# Patient Record
Sex: FEMALE | Race: WHITE | Employment: FULL TIME | URBAN - METROPOLITAN AREA
[De-identification: names, ages, dates, MRNs, and addresses within clinical notes are randomized per-mention and may not be internally consistent; named-entity substitution may affect disease eponyms.]

---

## 2017-02-28 ENCOUNTER — HOSPITAL ENCOUNTER (OUTPATIENT)
Dept: LAB | Age: 62
Discharge: HOME | End: 2017-02-28
Payer: COMMERCIAL

## 2017-02-28 ENCOUNTER — TELEPHONE (OUTPATIENT)
Dept: FAMILY MEDICINE CLINIC | Age: 62
End: 2017-02-28

## 2017-02-28 ENCOUNTER — OFFICE VISIT (OUTPATIENT)
Dept: FAMILY MEDICINE CLINIC | Age: 62
End: 2017-02-28

## 2017-02-28 VITALS
WEIGHT: 149 LBS | BODY MASS INDEX: 27.42 KG/M2 | SYSTOLIC BLOOD PRESSURE: 102 MMHG | HEART RATE: 70 BPM | TEMPERATURE: 97.7 F | HEIGHT: 62 IN | OXYGEN SATURATION: 98 % | DIASTOLIC BLOOD PRESSURE: 64 MMHG

## 2017-02-28 DIAGNOSIS — E53.8 B12 DEFICIENCY: ICD-10-CM

## 2017-02-28 DIAGNOSIS — Z92.89 HISTORY OF POSITIVE PPD: ICD-10-CM

## 2017-02-28 DIAGNOSIS — Z12.39 SCREENING FOR BREAST CANCER: ICD-10-CM

## 2017-02-28 DIAGNOSIS — Z00.00 ROUTINE GENERAL MEDICAL EXAMINATION AT A HEALTH CARE FACILITY: ICD-10-CM

## 2017-02-28 DIAGNOSIS — H93.19 TINNITUS, UNSPECIFIED LATERALITY: ICD-10-CM

## 2017-02-28 DIAGNOSIS — M81.0 OSTEOPOROSIS: Primary | ICD-10-CM

## 2017-02-28 DIAGNOSIS — M81.0 OSTEOPOROSIS: ICD-10-CM

## 2017-02-28 DIAGNOSIS — Z23 ENCOUNTER FOR IMMUNIZATION: ICD-10-CM

## 2017-02-28 DIAGNOSIS — Z86.2 HISTORY OF ANEMIA: ICD-10-CM

## 2017-02-28 DIAGNOSIS — E03.9 ACQUIRED HYPOTHYROIDISM: ICD-10-CM

## 2017-02-28 LAB
ALBUMIN SERPL BCP-MCNC: 4.2 G/DL (ref 3.4–5)
ALBUMIN/GLOB SERPL: 1.6 {RATIO} (ref 0.8–1.7)
ALP SERPL-CCNC: 69 U/L (ref 45–117)
ALT SERPL-CCNC: 20 U/L (ref 13–56)
ANION GAP BLD CALC-SCNC: 8 MMOL/L (ref 3–18)
AST SERPL W P-5'-P-CCNC: 11 U/L (ref 15–37)
BILIRUB SERPL-MCNC: 0.5 MG/DL (ref 0.2–1)
BUN SERPL-MCNC: 9 MG/DL (ref 7–18)
BUN/CREAT SERPL: 12 (ref 12–20)
CALCIUM SERPL-MCNC: 9.2 MG/DL (ref 8.5–10.1)
CHLORIDE SERPL-SCNC: 104 MMOL/L (ref 100–108)
CHOLEST SERPL-MCNC: 214 MG/DL
CO2 SERPL-SCNC: 29 MMOL/L (ref 21–32)
CREAT SERPL-MCNC: 0.76 MG/DL (ref 0.6–1.3)
ERYTHROCYTE [DISTWIDTH] IN BLOOD BY AUTOMATED COUNT: 13.1 % (ref 11.6–14.5)
GLOBULIN SER CALC-MCNC: 2.7 G/DL (ref 2–4)
GLUCOSE SERPL-MCNC: 89 MG/DL (ref 74–99)
HCT VFR BLD AUTO: 42.6 % (ref 35–45)
HDLC SERPL-MCNC: 68 MG/DL (ref 40–60)
HDLC SERPL: 3.1 {RATIO} (ref 0–5)
HGB BLD-MCNC: 14.1 G/DL (ref 12–16)
LDLC SERPL CALC-MCNC: 127.4 MG/DL (ref 0–100)
LIPID PROFILE,FLP: ABNORMAL
MCH RBC QN AUTO: 29.6 PG (ref 24–34)
MCHC RBC AUTO-ENTMCNC: 33.1 G/DL (ref 31–37)
MCV RBC AUTO: 89.3 FL (ref 74–97)
PLATELET # BLD AUTO: 308 K/UL (ref 135–420)
PMV BLD AUTO: 11 FL (ref 9.2–11.8)
POTASSIUM SERPL-SCNC: 4.2 MMOL/L (ref 3.5–5.5)
PROT SERPL-MCNC: 6.9 G/DL (ref 6.4–8.2)
RBC # BLD AUTO: 4.77 M/UL (ref 4.2–5.3)
SODIUM SERPL-SCNC: 141 MMOL/L (ref 136–145)
T3FREE SERPL-MCNC: 3.1 PG/ML (ref 2.3–4.2)
T4 FREE SERPL-MCNC: 1.2 NG/DL (ref 0.7–1.5)
TRIGL SERPL-MCNC: 93 MG/DL (ref ?–150)
TSH SERPL DL<=0.05 MIU/L-ACNC: 0.77 UIU/ML (ref 0.36–3.74)
VIT B12 SERPL-MCNC: 1437 PG/ML (ref 211–911)
VLDLC SERPL CALC-MCNC: 18.6 MG/DL
WBC # BLD AUTO: 5.2 K/UL (ref 4.6–13.2)

## 2017-02-28 PROCEDURE — 86480 TB TEST CELL IMMUN MEASURE: CPT | Performed by: INTERNAL MEDICINE

## 2017-02-28 PROCEDURE — 82607 VITAMIN B-12: CPT | Performed by: INTERNAL MEDICINE

## 2017-02-28 PROCEDURE — 84439 ASSAY OF FREE THYROXINE: CPT | Performed by: INTERNAL MEDICINE

## 2017-02-28 PROCEDURE — 84443 ASSAY THYROID STIM HORMONE: CPT | Performed by: INTERNAL MEDICINE

## 2017-02-28 PROCEDURE — 36415 COLL VENOUS BLD VENIPUNCTURE: CPT | Performed by: INTERNAL MEDICINE

## 2017-02-28 PROCEDURE — 84481 FREE ASSAY (FT-3): CPT | Performed by: INTERNAL MEDICINE

## 2017-02-28 PROCEDURE — 80053 COMPREHEN METABOLIC PANEL: CPT | Performed by: INTERNAL MEDICINE

## 2017-02-28 PROCEDURE — 80061 LIPID PANEL: CPT | Performed by: INTERNAL MEDICINE

## 2017-02-28 PROCEDURE — 85027 COMPLETE CBC AUTOMATED: CPT | Performed by: INTERNAL MEDICINE

## 2017-02-28 PROCEDURE — 82306 VITAMIN D 25 HYDROXY: CPT | Performed by: INTERNAL MEDICINE

## 2017-02-28 RX ORDER — UBIDECARENONE 50 MG
50 CAPSULE ORAL DAILY
COMMUNITY

## 2017-02-28 RX ORDER — FLUVOXAMINE MALEATE 50 MG/1
TABLET ORAL
COMMUNITY
End: 2017-06-06 | Stop reason: SDUPTHER

## 2017-02-28 RX ORDER — LEVOTHYROXINE AND LIOTHYRONINE 9.5; 2.25 UG/1; UG/1
TABLET ORAL DAILY
COMMUNITY
End: 2017-03-22 | Stop reason: SDUPTHER

## 2017-02-28 RX ORDER — ESTRADIOL 0.1 MG/G
2 CREAM VAGINAL DAILY
COMMUNITY
End: 2019-04-22 | Stop reason: ALTCHOICE

## 2017-02-28 RX ORDER — ERGOCALCIFEROL 1.25 MG/1
50000 CAPSULE ORAL
COMMUNITY
End: 2017-05-04 | Stop reason: ALTCHOICE

## 2017-02-28 RX ORDER — CYANOCOBALAMIN 1000 UG/ML
1000 INJECTION, SOLUTION INTRAMUSCULAR; SUBCUTANEOUS ONCE
COMMUNITY
End: 2017-05-02 | Stop reason: SDUPTHER

## 2017-02-28 RX ORDER — ESTRADIOL 0.04 MG/D
1 FILM, EXTENDED RELEASE TRANSDERMAL
COMMUNITY
End: 2017-03-06 | Stop reason: DRUGHIGH

## 2017-02-28 RX ORDER — MULTIVITAMIN WITH IRON
1 TABLET ORAL DAILY
COMMUNITY
End: 2018-10-01

## 2017-02-28 RX ORDER — PROGESTERONE 100 MG/1
100 CAPSULE ORAL
COMMUNITY
End: 2017-07-13 | Stop reason: SDUPTHER

## 2017-02-28 NOTE — TELEPHONE ENCOUNTER
[facility] is not able to fill.  Provided number for speciality pharmacy at [phone]

## 2017-02-28 NOTE — TELEPHONE ENCOUNTER
[address] [facility] called in regards to pt's Ann Marie Kaufman, saying that it needs to be filled at a specialty pharmacy.

## 2017-02-28 NOTE — PROGRESS NOTES
Deanna Mallory is a 64 y.o. female  Chief Complaint   Patient presents with   [address]     1. Have you been to the ER, urgent care clinic since your last visit? Hospitalized since your last visit? No    2. Have you seen or consulted any other health care providers outside of the [address] [place] [address] since your last visit? Include any pap smears or colon screening.  No

## 2017-02-28 NOTE — PATIENT INSTRUCTIONS
Denosumab (By injection)   Denosumab (xax-JMF-kn-mab)  Treats osteoporosis, bone cancer, and hypercalcemia and other bone problems in patients who have cancer. Brand Name(s):Prolia, Xgeva   There may be other brand names for this medicine. When This Medicine Should Not Be Used: This medicine is not right for everyone. You should not receive it if you had an allergic reaction to denosumab or if you are pregnant. How to Use This Medicine:   Injectable  · A doctor or other health professional will give you this medicine. This medicine is usually given as a shot under the skin of your upper arm, upper thigh, or stomach. · This medicine should come with a Medication Guide. Ask your pharmacist for a copy if you do not have one. · Missed dose: Call your doctor or pharmacist for instructions. Drugs and Foods to Avoid:   Ask your doctor or pharmacist before using any other medicine, including over-the-counter medicines, vitamins, and herbal products. · Do not use Prolia® and Xgeva® together. They contain the same medicine. · Some medicines can affect how denosumab works. Tell your doctor if you are also using medicine that weakens your immune system, such as a steroid or cancer medicine. Warnings While Using This Medicine:   · This medicine may cause birth defects if either partner is using it during conception or pregnancy. Tell your doctor right away if you or your partner becomes pregnant. Use an effective form of birth control to prevent pregnancy. Women who are being treated with [place] should continue using birth control for at least 5 months after the last dose. · Tell your doctor if you are breastfeeding, or if you have kidney disease, diabetes, gum disease, or an allergy to latex. Tell your doctor if you have problems with your thyroid, parathyroid, or digestive system.   · This medicine may cause the following problems:  ¨ Low calcium levels in your blood  ¨ Jaw problems  ¨ Broken thigh bone  ¨ Increased risk of infections  ¨ Rash or other skin problems  · You must have regular dental exams while you are being treated with this medicine. Tell your dentist that you are using this medicine. Do not have a tooth pulled or have other dental work. · Your doctor will do lab tests at regular visits to check on the effects of this medicine. Keep all appointments. Possible Side Effects While Using This Medicine:   Call your doctor right away if you notice any of these side effects:  · Allergic reaction: Itching or hives, swelling in your face or hands, swelling or tingling in your mouth or throat, chest tightness, trouble breathing  · Blistering, peeling, red skin rash  · Chest pain, fast or uneven heartbeat, trouble breathing  · Fever, chills, cough, sore throat, and body aches  · Muscle spasms or twitching, numbness or tingling in your fingers, toes, or lips  · Pain or burning during urination, change in how much or how often you urinate  · Pain, swelling, heavy feeling, or numbness in the mouth or jaw, loose teeth or other teeth problems  · Severe bone, joint, or muscle pain  · Unusual pain in your thigh, groin, or hip  If you notice these less serious side effects, talk with your doctor:   · Diarrhea, nausea  · Headache, back pain  · Rash, redness, or itching skin  · Tiredness or weakness  If you notice other side effects that you think are caused by this medicine, tell your doctor. Call your doctor for medical advice about side effects. You may report side effects to FDA at 1-156-FDA-2571  © 2016 [address] is for End User's use only and may not be sold, redistributed or otherwise used for commercial purposes. The above information is an  only. It is not intended as medical advice for individual conditions or treatments. Talk to your doctor, nurse or pharmacist before following any medical regimen to see if it is safe and effective for you.

## 2017-02-28 NOTE — PROGRESS NOTES
Assessment/Plan:    Jemma Benites was seen today for establish care. Diagnoses and all orders for this visit:    Osteoporosis- failed forteo. Is very concerned about osteonecrosis of jaw and wishes to avoid bisphosphonates. I discussed lack of evidence that HRT helps treat osteoporosis and wish for her to eventually wean off this. -     VITAMIN D, 25 HYDROXY; Future  -     denosumab (PROLIA) 60 mg/mL injection; 1 mL by SubCUTAneous route every 6 months. 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 If positive, will treat. -     QUANTIFERON TB GOLD; Future    Tinnitus  -pt to make appt with Dr. Asael Alvarez    The plan was discussed with the patient. The patient verbalized understanding and is in agreement with the plan. All medication potential side effects were discussed with the patient. Health Maintenance: pap- 2 yrs ago. mammo due. [facility] 3 yrs, no polyps. Dr. Syed Plan. Health Maintenance   Topic Date Due    BREAST CANCER SCRN MAMMOGRAM  08/23/2005    FOBT Q 1 YEAR AGE 50-75  08/23/2005    ZOSTER VACCINE AGE 60>  08/23/2015    PAP AKA CERVICAL CYTOLOGY  02/02/2018    DTaP/Tdap/Td series (2 - Td) 02/28/2027    Hepatitis C Screening  Addressed    INFLUENZA AGE 9 TO ADULT  Completed       Jono Gabriel is a 64 y.o.  female and presents with Establish Care     Subjective:  Pt is here to establish care. Pt c/o bilat tinnitus x 2 mos. No hearing loss. States it's constant. No vertigo. Is requesting Dr. Urvashi Grover.   She is concerned that her meds are causing the tinnitus (specifically the luvox and thyroid combo). She denies loud noise exposure. Osteoporosis- she does weight bearing. She is taking vitamin D. She is on HRT only for this reason. Not on bisphosphonate. Had been tried on forteo, but had bad side effect. She has been hesitant to do bisphosphonate b/c of concern of osteonecrosis of jaw. dexa 2016. Hypothyroid - on armour and synthroid. Has h/o positive ppd in 1970s. She does work for [employer] and has possible exposures. On luvox for mild depression/SAD. Has been on anti-depressants since age of 36. Has been on prozac and celxa in past. Feels sx are controlled. ROS:  Constitutional: No recent weight change. No weakness/fatigue. No f/c. Skin: No rashes, change in nails/hair, itching   HENT: No HA, dizziness. No hearing loss/+tinnitus. No nasal congestion/discharge. Eyes: No change in vision, double/blurred vision or eye pain/redness. Cardiovascular: No CP/palpitations. No ORELLANA/orthopnea/PND. Respiratory: No cough/sputum, dyspnea, wheezing. Gastointestinal: No dysphagia, reflux. No n/v. No constipation/diarrhea. No melena/rectal bleeding. Genitourinary: No dysuria, urinary hesitancy, nocturia, hematuria. No incontinence. Musculoskeletal: No joint pain/stiffness. No muscle pain/tenderness. Endo: No heat/cold intolerance, no polyuria/polydypsia. Heme: + h/o anemia. No easy bleeding/bruising. Allergy/Immunology: No seasonal rhinitis. Denies frequent colds, sinus/ear infections. Neurological: No seizures/numbness/weakness. No paresthesias. Psychiatric:  + depression, no anxiety.    PMH:  Past Medical History:   Diagnosis Date    Depression     Osteoarthritis     Thyroid disease     Tinnitus        PSH:  Past Surgical History:   Procedure Laterality Date    HX BACK SURGERY  2011    HX  SECTION          SH:  Social History   Substance Use Topics    Smoking status: Never Smoker    Smokeless tobacco: Not on file    Alcohol use No       FH:  Family History   Problem Relation Age of Onset    Osteoporosis Mother     Heart Disease Mother     Hypertension Mother     Arthritis-osteo Mother     Asthma Father     Hypertension Father     Osteoporosis Sister     Thyroid Disease Sister     Arthritis-osteo Sister     Bipolar Disorder Brother        Medications/Allergies:    Current Outpatient Prescriptions:     estradiol (VIVELLE) 0.0375 mg/24 hr, 1 Patch by TransDERmal route every Monday., Disp: , Rfl:     progesterone (PROMETRIUM) 100 mg capsule, Take 100 mg by mouth nightly., Disp: , Rfl:     estradiol (ESTRACE) 0.01 % (0.1 mg/gram) vaginal cream, Insert 2 g into vagina daily. , Disp: , Rfl:     ergocalciferol (VITAMIN D2) 50,000 unit capsule, Take 50,000 Units by mouth Every Mon, Wed & Sun., Disp: , Rfl:     thyroid, Pork, (ARMOUR THYROID) 15 mg tablet, Take  by mouth daily. , Disp: , Rfl:     fluvoxaMINE (LUVOX) 50 mg tablet, Take  by mouth nightly., Disp: , Rfl:     cyanocobalamin (VITAMIN B-12) 1,000 mcg/mL injection, 1,000 mcg by IntraMUSCular route once., Disp: , Rfl:     co-enzyme Q-10 (CO Q-10) 50 mg capsule, Take 50 mg by mouth daily. , Disp: , Rfl:     prasterone, dhea,-calcium carb (DHEA) 10 mg-47 mg calcium tab, Take  by mouth., Disp: , Rfl:     multivitamin with iron (DAILY MULTI-VITAMINS/IRON) tablet, Take 1 Tab by mouth daily. , Disp: , Rfl:   Allergies   Allergen Reactions    Penicillins Anaphylaxis    Codeine Rash       Objective:  Visit Vitals    /64 (BP 1 Location: Right arm, BP Patient Position: Sitting)    Pulse 70    Temp 97.7 °F (36.5 °C) (Temporal)    Ht 5' 1.5\" (1.562 m)    Wt 149 lb (67.6 kg)    SpO2 98%    BMI 27.7 kg/m2      Constitutional: Well developed, nourished, no distress, alert, obese habitus   HENT: Exterior ears and tympanic membranes normal bilaterally. Supple neck. No thyromegaly or lymphadenopathy.  Oropharynx clear and moist mucous membranes. +bilat middle ear effusion   Eyes: Conjunctiva normal. PERRL. Cardiovascular: S1, S2.  RRR. No murmurs/rubs. No thrills palpated. No carotid bruits. Intact distal pulses. No edema. Pulmonary/Chest Wall: No abnormalities on inspection. Clear to auscultation bilaterally. No wheezing/rhonchi. Normal effort. GI: Soft, nontender, nondistended. Normal active bowel sounds. No  masses on palpation. No hepatosplenomegaly. Musculoskeletal: Gait normal.  Joints without deformity/tenderness. Neurological: Appropriate. No focal motor or sensory deficits. Speech normal.   Skin: No lesions/rashes on inspection. Psych: Appropriate affect, judgement and insight. Short-term memory intact.

## 2017-02-28 NOTE — TELEPHONE ENCOUNTER
Please fax to [address] [facility] and tell pt that's where it was sent b/c [facility]'t carry it

## 2017-02-28 NOTE — MR AVS SNAPSHOT
Visit Information Date & Time Provider Department Dept. Phone Encounter #  
 2/28/2017  1:00 PM Dandre Sherrill, [facility] [phone] [id number] Upcoming Health Maintenance Date Due  
 PAP AKA CERVICAL CYTOLOGY 8/23/1976 BREAST CANCER SCRN MAMMOGRAM 8/23/2005 FOBT Q 1 YEAR AGE 50-75 8/23/2005 ZOSTER VACCINE AGE 60> 8/23/2015 DTaP/Tdap/Td series (2 - Td) 2/28/2027 Allergies as of 2/28/2017  Review Complete On: 2/28/2017 By: Dandre Mccartney MD  
  
 Severity Noted Reaction Type Reactions Penicillins High 02/28/2017    Anaphylaxis Codeine  02/28/2017    Rash Current Immunizations  Reviewed on 2/28/2017 Name Date Tdap 2/28/2017  2:03 PM  
  
 Reviewed by Beni Jimenez LPN on 9/37/8317 at  2:03 PM  
 Reviewed by Beni Jimenez LPN on 7/63/0549 at  2:03 PM  
You Were Diagnosed With   
  
 Codes Comments Osteoporosis    -  Primary ICD-10-CM: M81.0 ICD-9-CM: 733.00 Acquired hypothyroidism     ICD-10-CM: E03.9 ICD-9-CM: 244.9 History of anemia     ICD-10-CM: Z86.2 ICD-9-CM: V12.3 B12 deficiency     ICD-10-CM: E53.8 ICD-9-CM: 266.2 Routine general medical examination at a health care facility     ICD-10-CM: Z00.00 ICD-9-CM: V70.0 Screening for breast cancer     ICD-10-CM: Z12.39 
ICD-9-CM: V76.10 History of positive PPD     ICD-10-CM: R76.11 
ICD-9-CM: 795.51 Encounter for immunization     ICD-10-CM: L62 ICD-9-CM: V03.89 Vitals BP  
  
  
  
  
  
 102/64 (BP 1 Location: Right arm, BP Patient Position: Sitting) Vitals History BMI and BSA Data Body Mass Index Body Surface Area  
 27.7 kg/m 2 1.71 m 2 Preferred Pharmacy Pharmacy Name Phone [facility] [address], [address] [facility] [phone] Your Updated Medication List  
  
   
This list is accurate as of: 2/28/17  2:04 PM.  Always use your most recent med list.  
  
  
  
  
 KALLIE THYROID 15 mg tablet Generic drug:  thyroid (Pork) Take  by mouth daily. CO Q-10 50 mg capsule Generic drug:  co-enzyme Q-10 Take 50 mg by mouth daily. DAILY MULTI-VITAMINS/IRON tablet Generic drug:  multivitamin with iron Take 1 Tab by mouth daily. denosumab 60 mg/mL injection Commonly known as:  Gera Michelle 1 mL by SubCUTAneous route every 6 months. DHEA 10 mg-47 mg calcium Tab Generic drug:  prasterone (dhea)-calcium carb Take  by mouth. * estradiol 0.0375 mg/24 hr  
Commonly known as:  VIVELLE  
1 Patch by TransDERmal route every Monday. * ESTRACE 0.01 % (0.1 mg/gram) vaginal cream  
Generic drug:  estradiol Insert 2 g into vagina daily. fluvoxaMINE 50 mg tablet Commonly known as:  Thora Sour Take  by mouth nightly. PROMETRIUM 100 mg capsule Generic drug:  progesterone Take 100 mg by mouth nightly. VITAMIN B-12 1,000 mcg/mL injection Generic drug:  cyanocobalamin  
1,000 mcg by IntraMUSCular route once. VITAMIN D2 50,000 unit capsule Generic drug:  ergocalciferol Take 50,000 Units by mouth Every Mon, Wed & Sun.  
  
 * Notice: This list has 2 medication(s) that are the same as other medications prescribed for you. Read the directions carefully, and ask your doctor or other care provider to review them with you. Prescriptions Sent to Pharmacy Refills  
 denosumab (PROLIA) 60 mg/mL injection 1 Si mL by SubCUTAneous route every 6 months. Class: Normal  
 Pharmacy: [facility] [address], [address] [place] [address] #: [phone] Route: SubCUTAneous We Performed the Following CO IMMUNIZ ADMIN,1 SINGLE/COMB VAC/TOXOID [id number] CPT(R)] TETANUS, DIPHTHERIA TOXOIDS AND ACELLULAR PERTUSSIS VACCINE (TDAP), IN INDIVIDS. >=7, IM [id number] CPT(R)] To-Do List   
 2017 Lab:  CBC W/O DIFF   
  
 2017 Lab:  LIPID PANEL   
  
 2017 Imaging:  JOHANN MAMMO BI SCREENING INCL CAD   
  
 02/28/2017 Lab:  METABOLIC PANEL, COMPREHENSIVE   
  
 02/28/2017 Microbiology:  QUANTIFERON TB GOLD   
  
 02/28/2017 Lab:  T3, FREE   
  
 02/28/2017 Lab:  T4, FREE   
  
 02/28/2017 Lab:  TSH 3RD GENERATION   
  
 02/28/2017 Lab:  VITAMIN B12   
  
 02/28/2017 Lab:  VITAMIN D, 25 HYDROXY Patient Instructions Denosumab (By injection) Denosumab (cso-OQU-we-mab) Treats osteoporosis, bone cancer, and hypercalcemia and other bone problems in patients who have cancer. Brand Name(s):Prolia, Tanya Jose There may be other brand names for this medicine. When This Medicine Should Not Be Used: This medicine is not right for everyone. You should not receive it if you had an allergic reaction to denosumab or if you are pregnant. How to Use This Medicine:  
Injectable · A doctor or other health professional will give you this medicine. This medicine is usually given as a shot under the skin of your upper arm, upper thigh, or stomach. · This medicine should come with a Medication Guide. Ask your pharmacist for a copy if you do not have one. · Missed dose: Call your doctor or pharmacist for instructions. Drugs and Foods to Avoid: Ask your doctor or pharmacist before using any other medicine, including over-the-counter medicines, vitamins, and herbal products. · Do not use Prolia® and Xgeva® together. They contain the same medicine. · Some medicines can affect how denosumab works. Tell your doctor if you are also using medicine that weakens your immune system, such as a steroid or cancer medicine. Warnings While Using This Medicine: · This medicine may cause birth defects if either partner is using it during conception or pregnancy. Tell your doctor right away if you or your partner becomes pregnant. Use an effective form of birth control to prevent pregnancy.  Women who are being [facility] with [facility] should continue using birth control for at least 5 months after the last dose. · Tell your doctor if you are breastfeeding, or if you have kidney disease, diabetes, gum disease, or an allergy to latex. Tell your doctor if you have problems with your thyroid, parathyroid, or digestive system. · This medicine may cause the following problems: 
¨ Low calcium levels in your blood ¨ Jaw problems ¨ Broken thigh bone ¨ Increased risk of infections ¨ Rash or other skin problems · You must have regular dental exams while you are being treated with this medicine. Tell your dentist that you are using this medicine. Do not have a tooth pulled or have other dental work. · Your doctor will do lab tests at regular visits to check on the effects of this medicine. Keep all appointments. Possible Side Effects While Using This Medicine:  
Call your doctor right away if you notice any of these side effects: · Allergic reaction: Itching or hives, swelling in your face or hands, swelling or tingling in your mouth or throat, chest tightness, trouble breathing · Blistering, peeling, red skin rash · Chest pain, fast or uneven heartbeat, trouble breathing · Fever, chills, cough, sore throat, and body aches · Muscle spasms or twitching, numbness or tingling in your fingers, toes, or lips · Pain or burning during urination, change in how much or how often you urinate · Pain, swelling, heavy feeling, or numbness in the mouth or jaw, loose teeth or other teeth problems · Severe bone, joint, or muscle pain · Unusual pain in your thigh, groin, or hip If you notice these less serious side effects, talk with your doctor: · Diarrhea, nausea · Headache, back pain · Rash, redness, or itching skin · Tiredness or weakness If you notice other side effects that you think are caused by this medicine, tell your doctor. Call your doctor for medical advice about side effects. You may report side effects to FDA at 9-394-JYM-3281 © 2016 [address] is for End User's use only and may not be sold, redistributed or otherwise used for commercial purposes. The above information is an  only. It is not intended as medical advice for individual conditions or treatments. Talk to your doctor, nurse or pharmacist before following any medical regimen to see if it is safe and effective for you. Introducing [facility]! [employer] introduces [employer] patient portal. Now you can access parts of your medical record, email your doctor's office, and request medication refills online. 1. In your internet browser, go to [email or web address] 2. Click on the First Time User? Click Here link in the Sign In box. You will see the New Member Sign Up page. 3. Enter your [employer] Access Code exactly as it appears below. You will not need to use this code after youve completed the sign-up process. If you do not sign up before the expiration date, you must request a new code. · [employer] Access Code: [email or web address] Expires: 5/29/2017  1:52 PM 
 
4. Enter the last four digits of your Social Security Number (xxxx) and Date of Birth (mm/dd/yyyy) as indicated and click Submit. You will be taken to the next sign-up page. 5. Create a [employer] ID. This will be your [employer] login ID and cannot be changed, so think of one that is secure and easy to remember. 6. Create a [employer] password. You can change your password at any time. 7. Enter your Password Reset Question and Answer. This can be used at a later time if you forget your password. 8. Enter your e-mail address. You will receive e-mail notification when new information is available in [address] 9. Click Sign Up. You can now view and download portions of your medical record. 10. Click the Download Summary menu link to download a portable copy of your medical information. If you have questions, please visit the Frequently Asked Questions section of the [employer] website. Remember, [employer] is NOT to be used for urgent needs. For medical emergencies, dial 911. Now available from your iPhone and Android! Please provide this summary of care documentation to your next provider. Your primary care clinician is listed as Ana Cook. If you have any questions after today's visit, please call [phone].

## 2017-03-01 ENCOUNTER — TELEPHONE (OUTPATIENT)
Dept: FAMILY MEDICINE CLINIC | Age: 62
End: 2017-03-01

## 2017-03-01 LAB — 25(OH)D3 SERPL-MCNC: >150 NG/ML (ref 30–100)

## 2017-03-02 NOTE — PROGRESS NOTES
Lvm for pt to call office. Her vitamin D is very high. I need to know how much she is taking. We either need to decrease dose or stop supplement. Otherwise, her labs look good. Her cholesterol is mildly elevated, but as we discussed, I don't think we need to jump to treat it.

## 2017-03-03 DIAGNOSIS — T45.2X1A VITAMIN D TOXICITY, ACCIDENTAL OR UNINTENTIONAL, INITIAL ENCOUNTER: Primary | ICD-10-CM

## 2017-03-03 PROBLEM — N30.10 INTERSTITIAL CYSTITIS: Status: ACTIVE | Noted: 2017-03-03

## 2017-03-03 LAB
ANNOTATION COMMENT IMP: NORMAL
M TB IFN-G CD4+ BCKGRND COR BLD-ACNC: 0 IU/ML
M TB IFN-G CD4+ T-CELLS BLD-ACNC: 0.03 IU/ML
M TB TUBERC IFN-G BLD QL: NEGATIVE
M TB TUBERC IGNF/MITOGEN IGNF CONTROL: >10 IU/ML
QUANTIFERON NIL VALUE: 0.03 IU/ML
SERVICE CMNT-IMP: NORMAL

## 2017-03-06 ENCOUNTER — TELEPHONE (OUTPATIENT)
Dept: FAMILY MEDICINE CLINIC | Age: 62
End: 2017-03-06

## 2017-03-06 RX ORDER — ESTRADIOL 0.03 MG/D
FILM, EXTENDED RELEASE TRANSDERMAL
Qty: 8 PATCH | Refills: 0 | Status: SHIPPED | OUTPATIENT
Start: 2017-03-06 | End: 2017-10-24 | Stop reason: SDUPTHER

## 2017-03-06 RX ORDER — ALENDRONATE SODIUM 70 MG/1
70 TABLET ORAL
Qty: 4 TAB | Refills: 11 | Status: SHIPPED | OUTPATIENT
Start: 2017-03-06 | End: 2017-10-24 | Stop reason: ALTCHOICE

## 2017-03-06 NOTE — TELEPHONE ENCOUNTER
Patient stated she will try fosamax, she also wants to know if she can stop her hormone therapy or should she be whined off.

## 2017-03-21 ENCOUNTER — HOSPITAL ENCOUNTER (OUTPATIENT)
Dept: MAMMOGRAPHY | Age: 62
Discharge: HOME | End: 2017-03-21
Attending: INTERNAL MEDICINE
Payer: COMMERCIAL

## 2017-03-21 DIAGNOSIS — Z12.31 VISIT FOR SCREENING MAMMOGRAM: ICD-10-CM

## 2017-03-21 PROCEDURE — 77067 SCR MAMMO BI INCL CAD: CPT

## 2017-03-22 RX ORDER — LEVOTHYROXINE SODIUM 75 UG/1
75 TABLET ORAL
Qty: 90 TAB | Refills: 3 | Status: SHIPPED | OUTPATIENT
Start: 2017-03-22 | End: 2018-03-20 | Stop reason: SDUPTHER

## 2017-03-22 RX ORDER — LEVOTHYROXINE AND LIOTHYRONINE 9.5; 2.25 UG/1; UG/1
15 TABLET ORAL DAILY
Qty: 90 TAB | Refills: 3 | Status: SHIPPED | OUTPATIENT
Start: 2017-03-22 | End: 2017-04-07 | Stop reason: SDUPTHER

## 2017-03-22 NOTE — TELEPHONE ENCOUNTER
Pt also takes Levothyroxine 75mg (pt forgot to add this to her list of medications); pt is going out of town tomorrow and needs a refill; please advise

## 2017-03-23 DIAGNOSIS — Z12.11 SCREEN FOR COLON CANCER: Primary | ICD-10-CM

## 2017-03-23 DIAGNOSIS — Z12.11 SCREEN FOR COLON CANCER: ICD-10-CM

## 2017-04-07 ENCOUNTER — TELEPHONE (OUTPATIENT)
Dept: FAMILY MEDICINE CLINIC | Age: 62
End: 2017-04-07

## 2017-04-07 RX ORDER — LEVOTHYROXINE AND LIOTHYRONINE 9.5; 2.25 UG/1; UG/1
15 TABLET ORAL 2 TIMES DAILY
Qty: 180 TAB | Refills: 3 | Status: SHIPPED | OUTPATIENT
Start: 2017-04-07 | End: 2018-03-22 | Stop reason: SDUPTHER

## 2017-04-07 NOTE — TELEPHONE ENCOUNTER
Pt called stating her [place] Thyroid was called in but it was incorrect dosing. The rx was written for 1 daily but the pt states she takes the medication 2 times daily. Please advise.

## 2017-05-02 ENCOUNTER — TELEPHONE (OUTPATIENT)
Dept: FAMILY MEDICINE CLINIC | Age: 62
End: 2017-05-02

## 2017-05-02 RX ORDER — CYANOCOBALAMIN 1000 UG/ML
1000 INJECTION, SOLUTION INTRAMUSCULAR; SUBCUTANEOUS
Qty: 10 ML | Refills: 0 | Status: SHIPPED | OUTPATIENT
Start: 2017-05-02 | End: 2018-05-20 | Stop reason: SDUPTHER

## 2017-05-02 NOTE — TELEPHONE ENCOUNTER
Pt requesting refill/new RX for:  Cyanocobalamin 1,000 mcg/ml    Last visit: 02/28/17    Thank you    Elie Mckay LPN

## 2017-05-03 ENCOUNTER — HOSPITAL ENCOUNTER (OUTPATIENT)
Dept: LAB | Age: 62
Discharge: HOME | End: 2017-05-03
Payer: COMMERCIAL

## 2017-05-03 DIAGNOSIS — T45.2X1A VITAMIN D TOXICITY, ACCIDENTAL OR UNINTENTIONAL, INITIAL ENCOUNTER: ICD-10-CM

## 2017-05-03 PROCEDURE — 82306 VITAMIN D 25 HYDROXY: CPT | Performed by: INTERNAL MEDICINE

## 2017-05-03 PROCEDURE — 36415 COLL VENOUS BLD VENIPUNCTURE: CPT | Performed by: INTERNAL MEDICINE

## 2017-05-04 LAB — 25(OH)D3 SERPL-MCNC: 55.9 NG/ML (ref 30–100)

## 2017-06-06 RX ORDER — FLUVOXAMINE MALEATE 50 MG/1
50 TABLET ORAL
Qty: 90 TAB | Refills: 1 | Status: SHIPPED | OUTPATIENT
Start: 2017-06-06 | End: 2017-12-11 | Stop reason: SDUPTHER

## 2017-06-06 NOTE — TELEPHONE ENCOUNTER
Med is under historical [facility] requesting refill o    Requested Prescriptions     Pending Prescriptions Disp Refills    fluvoxaMINE (LUVOX) 50 mg tablet       Sig: Take  by mouth nightly.

## 2017-07-13 RX ORDER — PROGESTERONE 100 MG/1
CAPSULE ORAL
Qty: 42 CAP | Refills: 0 | Status: SHIPPED | OUTPATIENT
Start: 2017-07-13 | End: 2017-10-27 | Stop reason: SDUPTHER

## 2017-10-24 ENCOUNTER — OFFICE VISIT (OUTPATIENT)
Dept: FAMILY MEDICINE CLINIC | Age: 62
End: 2017-10-24

## 2017-10-24 ENCOUNTER — TELEPHONE (OUTPATIENT)
Dept: FAMILY MEDICINE CLINIC | Age: 62
End: 2017-10-24

## 2017-10-24 VITALS
HEIGHT: 62 IN | WEIGHT: 150.6 LBS | BODY MASS INDEX: 27.71 KG/M2 | SYSTOLIC BLOOD PRESSURE: 128 MMHG | DIASTOLIC BLOOD PRESSURE: 80 MMHG | RESPIRATION RATE: 18 BRPM | HEART RATE: 75 BPM | TEMPERATURE: 97.7 F | OXYGEN SATURATION: 96 %

## 2017-10-24 DIAGNOSIS — N95.1 MENOPAUSAL SYMPTOM: Primary | ICD-10-CM

## 2017-10-24 DIAGNOSIS — M21.611 BUNION OF GREAT TOE OF RIGHT FOOT: ICD-10-CM

## 2017-10-24 DIAGNOSIS — Z12.11 SCREEN FOR COLON CANCER: ICD-10-CM

## 2017-10-24 DIAGNOSIS — Z23 ENCOUNTER FOR IMMUNIZATION: ICD-10-CM

## 2017-10-24 DIAGNOSIS — M81.0 AGE-RELATED OSTEOPOROSIS WITHOUT CURRENT PATHOLOGICAL FRACTURE: ICD-10-CM

## 2017-10-24 RX ORDER — ESTRADIOL 0.03 MG/D
FILM, EXTENDED RELEASE TRANSDERMAL
Qty: 24 PATCH | Refills: 1 | Status: SHIPPED | OUTPATIENT
Start: 2017-10-24 | End: 2018-08-21 | Stop reason: SDUPTHER

## 2017-10-24 NOTE — PROGRESS NOTES
Edilia Armstrong is a 58 y.o. female (: 1955) presenting to address:    Chief Complaint   Patient presents with    Hypothyroidism    Vitamin B12 Deficiency    Foot Pain     Right    Medication Refill    Medication Evaluation       Vitals:    10/24/17 1057   BP: 128/80   Pulse: 75   Resp: 18   Temp: 97.7 °F (36.5 °C)   TempSrc: Oral   SpO2: 96%   Weight: 150 lb 9.6 oz (68.3 kg)   Height: 5' 1.5\" (1.562 m)   PainSc:   7   PainLoc: Foot       Learning Assessment:     Learning Assessment 2017   PRIMARY LEARNER Patient   HIGHEST LEVEL OF EDUCATION - PRIMARY LEARNER  4 YEARS OF COLLEGE   PRIMARY LANGUAGE ENGLISH   LEARNER PREFERENCE PRIMARY DEMONSTRATION     PICTURES   ANSWERED BY Self   RELATIONSHIP SELF     Depression Screening:     PHQ over the last two weeks 2017   PHQ Not Done Medical Reason (indicate in comments)   Little interest or pleasure in doing things -   Feeling down, depressed or hopeless -   Total Score PHQ 2 -     Fall Risk Assessment:     Fall Risk Assessment, last 12 mths 10/24/2017   Able to walk? Yes   Fall in past 12 months? No     Abuse Screening:     Abuse Screening Questionnaire 10/24/2017   Do you ever feel afraid of your partner? N   Are you in a relationship with someone who physically or mentally threatens you? N   Is it safe for you to go home? Y     Coordination of Care Questionaire:   1. Have you been to the ER, urgent care clinic since your last visit? Hospitalized since your last visit? NO    2. Have you seen or consulted any other health care providers outside of the [address] [place] [address] since your last visit? Include any pap smears or colon screening. NO    Advanced Directive:   1. Do you have an Advanced Directive? YES    2. Would you like information on Advanced Directives?  NO

## 2017-10-24 NOTE — PATIENT INSTRUCTIONS
You have been referred to podiatry. Please call one of the preferred providers listed below and schedule your appointment. Once you have scheduled your appointment, please call the office at [phone] and leave the details of your appointment (provider you will be seeing, appointment date and time) on the voice mail.       Estela Banuelos Dr. [address] [place] [address]  [phone]

## 2017-10-24 NOTE — PROGRESS NOTES
Assessment/Plan:    1. Menopausal symptom  -refilled. Discussed risk of breast/ovarian cancer with increased duration of use  - estradiol (VIVELLE-DOT) 0.025 mg/24 hr ptsw; Apply twice weekly  Dispense: 24 Patch; Refill: 1    2. Age-related osteoporosis without current pathological fracture  -failed fosamax secondary to side effects  - denosumab (PROLIA) 60 mg/mL injection; 1 mL by SubCUTAneous route every 6 months. Dispense: 1 mL; Refill: 1    3. Bunion of great toe of right foot  -pt given info for podiatry    4. Screen for colon cancer  - OCCULT BLOOD, IMMUNOASSAY (FIT); Future    5. Encounter for immunization  - Influenza virus vaccine (QUADRIVALENT PRES FREE SYRINGE) IM ([id number])  - MS IMMUNIZ ADMIN,1 SINGLE/COMB VAC/TOXOID    The plan was discussed with the patient. The patient verbalized understanding and is in agreement with the plan. All medication potential side effects were discussed with the patient. Health Maintenance: still hasn't done FIT testing  Health Maintenance   Topic Date Due    FOBT Q 1 YEAR AGE 50-75  08/23/2005    ZOSTER VACCINE AGE 60>  06/23/2015    INFLUENZA AGE 9 TO ADULT  08/01/2017    PAP AKA CERVICAL CYTOLOGY  02/02/2018    BREAST CANCER SCRN MAMMOGRAM  03/21/2019    DTaP/Tdap/Td series (2 - Td) 02/28/2027    Hepatitis C Screening  Addressed       Veronique Lehman is a 58 y.o. female and presents with Hypothyroidism; Vitamin B12 Deficiency; Foot Pain (Right); Medication Refill; and Medication Evaluation     Subjective:  Pt was weaned off HRT. She states her interstitial cystitis flared, and she 'felt awful'. She states she was irritable and had night sweats. She wishes to continue on HRT for another 3 years. She is aware of risks, to include breast and ovarian cancer. Osteoporosis - intol of fosamax 2/2 gerd. She wishes to try prolia. Pt c/o R bunion. Has pain and her shoes don't fit. ROS:  Constitutional: No recent weight change. No weakness/fatigue. No f/c. Skin: No rashes, change in nails/hair, itching   HENT: No HA, dizziness. No hearing loss/tinnitus. No nasal congestion/discharge. Eyes: No change in vision, double/blurred vision or eye pain/redness. Cardiovascular: No CP/palpitations. No ORELLANA/orthopnea/PND. Respiratory: No cough/sputum, dyspnea, wheezing. Gastointestinal: No dysphagia,+ reflux. No n/v. No constipation/diarrhea. No melena/rectal bleeding. Genitourinary: No dysuria, urinary hesitancy, nocturia, hematuria. No incontinence. Musculoskeletal: No joint pain/stiffness. No muscle pain/tenderness. Endo: No heat/cold intolerance, no polyuria/polydypsia. Heme: No h/o anemia. No easy bleeding/bruising. Allergy/Immunology: No seasonal rhinitis. Denies frequent colds, sinus/ear infections. Neurological: No seizures/numbness/weakness. No paresthesias. Psychiatric:  No depression, anxiety. The problem list was updated as a part of today's visit. Patient Active Problem List   Diagnosis Code    Osteoporosis M81.0    Acquired hypothyroidism E03.9    History of anemia Z86.2    B12 deficiency E53.8    History of positive PPD R76.11    Tinnitus H93.19    Interstitial cystitis N30.10       The PSH, FH were reviewed. SH:  Social History   Substance Use Topics    Smoking status: Never Smoker    Smokeless tobacco: Never Used    Alcohol use No       Medications/Allergies:  Current Outpatient Prescriptions on File Prior to Visit   Medication Sig Dispense Refill    pentosan polysulfate sodium (ELMIRON) 100 mg capsule Take 1 Cap by mouth Before breakfast, lunch, and dinner. Indications: Interstitial Cystitis 90 Cap 0    progesterone (PROMETRIUM) 100 mg capsule Take one cap PO daily x 2weeks. Then two weeks off. 42 Cap 0    fluvoxaMINE (LUVOX) 50 mg tablet Take 1 Tab by mouth nightly. 90 Tab 1    cyanocobalamin (VITAMIN B-12) 1,000 mcg/mL injection 1 mL by IntraMUSCular route every thirty (30) days.  10 mL 0    thyroid, Pork, (ARMOUR THYROID) 15 mg tablet Take 1 Tab by mouth two (2) times a day. 180 Tab 3    levothyroxine (SYNTHROID) 75 mcg tablet Take 1 Tab by mouth Daily (before breakfast). 90 Tab 3    estradiol (VIVELLE-DOT) 0.025 mg/24 hr ptsw Apply twice weekly 8 Patch 0    estradiol (ESTRACE) 0.01 % (0.1 mg/gram) vaginal cream Insert 2 g into vagina daily.  co-enzyme Q-10 (CO Q-10) 50 mg capsule Take 50 mg by mouth daily.  prasterone, dhea,-calcium carb (DHEA) 10 mg-47 mg calcium tab Take  by mouth.  multivitamin with iron (DAILY MULTI-VITAMINS/IRON) tablet Take 1 Tab by mouth daily. No current facility-administered medications on file prior to visit. Allergies   Allergen Reactions    Penicillins Anaphylaxis    Codeine Rash       Objective:  Visit Vitals    /80 (BP 1 Location: Right arm, BP Patient Position: Sitting)    Pulse 75    Temp 97.7 °F (36.5 °C) (Oral)    Resp 18    Ht 5' 1.5\" (1.562 m)    Wt 150 lb 9.6 oz (68.3 kg)    SpO2 96%    BMI 27.99 kg/m2      Constitutional: Well developed, nourished, no distress, alert   HENT: Exterior ears and tympanic membranes normal bilaterally. Supple neck. No thyromegaly or lymphadenopathy. Oropharynx clear and moist mucous membranes. +bilat middle ear effusion   Eyes: Conjunctiva normal. PERRL. CV: S1, S2.  RRR. No murmurs/rubs. No thrills palpated. No carotid bruits. Intact distal pulses. No edema. Pulm: No abnormalities on inspection. Clear to auscultation bilaterally. No wheezing/rhonchi. Normal effort.        Labwork and Ancillary Studies:    CBC w/Diff  Lab Results   Component Value Date/Time    WBC 5.2 02/28/2017 02:11 PM    HGB 14.1 02/28/2017 02:11 PM    PLATELET 391 43/50/0984 02:11 PM         Basic Metabolic Profile/LFTs  Lab Results   Component Value Date/Time    Sodium 141 02/28/2017 02:11 PM    Potassium 4.2 02/28/2017 02:11 PM    Chloride 104 02/28/2017 02:11 PM    CO2 29 02/28/2017 02:11 PM    Anion gap 8 02/28/2017 02:11 PM    Glucose 89 02/28/2017 02:11 PM    BUN 9 02/28/2017 02:11 PM    Creatinine 0.76 02/28/2017 02:11 PM    BUN/Creatinine ratio 12 02/28/2017 02:11 PM    GFR est AA >60 02/28/2017 02:11 PM    GFR est non-AA >60 02/28/2017 02:11 PM    Calcium 9.2 02/28/2017 02:11 PM      Lab Results   Component Value Date/Time    ALT (SGPT) 20 02/28/2017 02:11 PM    AST (SGOT) 11 02/28/2017 02:11 PM    Alk.  phosphatase 69 02/28/2017 02:11 PM    Bilirubin, total 0.5 02/28/2017 02:11 PM       Cholesterol  Lab Results   Component Value Date/Time    Cholesterol, total 214 02/28/2017 02:11 PM    HDL Cholesterol 68 02/28/2017 02:11 PM    LDL, calculated 127.4 02/28/2017 02:11 PM    Triglyceride 93 02/28/2017 02:11 PM    CHOL/HDL Ratio 3.1 02/28/2017 02:11 PM

## 2017-10-24 NOTE — PROGRESS NOTES
Per verbal orders of Dr. Rand Buchanan, injection of flu shot given by Samira Davey LPN. Patient instructed to remain in clinic for 20 minutes afterwards, and to report any adverse reaction to me immediately. Vaccine documentation completed. Tolerated well.    Consent signed

## 2017-10-24 NOTE — MR AVS SNAPSHOT
Visit Information Date & Time Provider Department Dept. Phone Encounter #  
 10/24/2017 10:45 AM Weakley Sherrill, 3 [facility] [phone] [id number] Upcoming Health Maintenance Date Due FOBT Q 1 YEAR AGE 50-75 8/23/2005 ZOSTER VACCINE AGE 60> 6/23/2015 INFLUENZA AGE 9 TO ADULT 8/1/2017 PAP AKA CERVICAL CYTOLOGY 2/2/2018 BREAST CANCER SCRN MAMMOGRAM 3/21/2019 DTaP/Tdap/Td series (2 - Td) 2/28/2027 Allergies as of 10/24/2017  Review Complete On: 10/24/2017 By: Dandre Mccartney MD  
  
 Severity Noted Reaction Type Reactions Penicillins High 02/28/2017    Anaphylaxis Codeine  02/28/2017    Rash Current Immunizations  Reviewed on 2/28/2017 Name Date Influenza Vaccine (Quad) PF  Incomplete Tdap 2/28/2017  2:03 PM  
  
 Not reviewed this visit You Were Diagnosed With   
  
 Codes Comments Menopausal symptom    -  Primary ICD-10-CM: N95.1 ICD-9-CM: 627.2 Age-related osteoporosis without current pathological fracture     ICD-10-CM: M81.0 ICD-9-CM: 733.01 Bunion of great toe of right foot     ICD-10-CM: M21.611 ICD-9-CM: 727.1 Screen for colon cancer     ICD-10-CM: Z12.11 ICD-9-CM: V76.51 Encounter for immunization     ICD-10-CM: S33 ICD-9-CM: V03.89 Vitals BP Pulse Temp Resp Height(growth percentile) Weight(growth percentile) 128/80 (BP 1 Location: Right arm, BP Patient Position: Sitting) 75 97.7 °F (36.5 °C) (Oral) 18 5' 1.5\" (1.562 m) 150 lb 9.6 oz (68.3 kg) SpO2 BMI OB Status Smoking Status 96% 27.99 kg/m2 Postmenopausal Never Smoker Vitals History BMI and BSA Data Body Mass Index Body Surface Area  
 27.99 kg/m 2 1.72 m 2 Preferred Pharmacy Pharmacy Name Phone [facility] [address], [address] [facility] [phone] Your Updated Medication List  
  
   
This list is accurate as of: 10/24/17 11:21 AM.  Always use your most recent med list.  
  
  
  
  
 CO Q-10 50 mg capsule Generic drug:  co-enzyme Q-10 Take 50 mg by mouth daily. cyanocobalamin 1,000 mcg/mL injection Commonly known as:  VITAMIN B-12  
1 mL by IntraMUSCular route every thirty (30) days. DAILY MULTI-VITAMINS/IRON tablet Generic drug:  multivitamin with iron Take 1 Tab by mouth daily. denosumab 60 mg/mL injection Commonly known as:  Zakiya Harpin 1 mL by SubCUTAneous route every 6 months. DHEA 10 mg-47 mg calcium Tab Generic drug:  prasterone (dhea)-calcium carb Take  by mouth. * ESTRACE 0.01 % (0.1 mg/gram) vaginal cream  
Generic drug:  estradiol Insert 2 g into vagina daily. * estradiol 0.025 mg/24 hr Ptsw Commonly known as:  VIVELLE-DOT Apply twice weekly  
  
 fluvoxaMINE 50 mg tablet Commonly known as:  Ruth Dubose Take 1 Tab by mouth nightly. levothyroxine 75 mcg tablet Commonly known as:  SYNTHROID Take 1 Tab by mouth Daily (before breakfast). pentosan polysulfate sodium 100 mg capsule Commonly known as:  Wil Drafts Take 1 Cap by mouth Before breakfast, lunch, and dinner. Indications: Interstitial Cystitis  
  
 progesterone 100 mg capsule Commonly known as:  PROMETRIUM Take one cap PO daily x 2weeks. Then two weeks off.  
  
 thyroid (Pork) 15 mg tablet Commonly known as:  ARMOUR THYROID Take 1 Tab by mouth two (2) times a day. * Notice: This list has 2 medication(s) that are the same as other medications prescribed for you. Read the directions carefully, and ask your doctor or other care provider to review them with you. Prescriptions Sent to Pharmacy Refills  
 denosumab (PROLIA) 60 mg/mL injection 1 Si mL by SubCUTAneous route every 6 months. Class: Normal  
 Pharmacy: [facility] [facility] #: [phone] Route: SubCUTAneous  
 estradiol (VIVELLE-DOT) 0.025 mg/24 hr ptsw 1 Sig: Apply twice weekly Class: Normal  
 Pharmacy: [facility] [address] [facility] #: [phone] We Performed the Following INFLUENZA VIRUS VAC QUAD,SPLIT,PRESV FREE SYRINGE IM [id number] CPT(R)] UT IMMUNIZ ADMIN,1 SINGLE/COMB VAC/TOXOID [id number] CPT(R)] To-Do List   
 11/23/2017 Lab:  OCCULT BLOOD, IMMUNOASSAY (FIT) Patient Instructions You have been referred to podiatry. Please call one of the preferred providers listed below and schedule your appointment. Once you have scheduled your appointment, please call the office at [phone] and leave the details of your appointment (provider you will be seeing, appointment date and time) on the voice mail. Dr. Smart Back [facility] [address] [facility] [place] Ness [address] [place] [address] 
[phone] [facility]! Dear Morena Sahni: Thank you for requesting a [employer] account. Our records indicate that you already have an active [employer] account. You can access your account anytime at [email or web address] Did you know that you can access your hospital and ER discharge instructions at any time in [employer]? You can also review all of your test results from your hospital stay or ER visit. Additional Information If you have questions, please visit the Frequently Asked Questions section of the [employer] website at [email or web address] Remember, [employer] is NOT to be used for urgent needs. For medical emergencies, dial 911. Now available from your iPhone and Android! Please provide this summary of care documentation to your next provider. Your primary care clinician is listed as Ana Cook. If you have any questions after today's visit, please call [phone].

## 2017-10-25 DIAGNOSIS — M81.0 AGE-RELATED OSTEOPOROSIS WITHOUT CURRENT PATHOLOGICAL FRACTURE: ICD-10-CM

## 2017-11-20 ENCOUNTER — DOCUMENTATION ONLY (OUTPATIENT)
Dept: FAMILY MEDICINE CLINIC | Age: 62
End: 2017-11-20

## 2017-11-23 DIAGNOSIS — Z12.11 SCREEN FOR COLON CANCER: ICD-10-CM

## 2017-11-30 PROBLEM — K22.719 BARRETT'S ESOPHAGUS WITH DYSPLASIA: Status: ACTIVE | Noted: 2017-11-30

## 2017-12-04 ENCOUNTER — TELEPHONE (OUTPATIENT)
Dept: FAMILY MEDICINE CLINIC | Age: 62
End: 2017-12-04

## 2017-12-04 NOTE — TELEPHONE ENCOUNTER
Left VM that pt's Prolia was approved for one year. Refaxed Rx to [facility] .

## 2018-01-24 ENCOUNTER — TELEPHONE (OUTPATIENT)
Dept: FAMILY MEDICINE CLINIC | Age: 63
End: 2018-01-24

## 2018-01-24 NOTE — TELEPHONE ENCOUNTER
Called pt, Prolia is still not shipped. Spoke to [facility] pharmacy. They state they were waiting on a Dx code from us to finish the insurance authorization. They state they called and sent a fax. The only recent paperwork from them was an original prior auth to restart the process. I gave the Dx code to the rep. He stated they will finish the ins auth and call the pt to re-enroll her in the program. I called the pt. She will call them to help speed things along.

## 2018-03-01 ENCOUNTER — OFFICE VISIT (OUTPATIENT)
Dept: FAMILY MEDICINE CLINIC | Age: 63
End: 2018-03-01

## 2018-03-01 ENCOUNTER — HOSPITAL ENCOUNTER (OUTPATIENT)
Dept: LAB | Age: 63
Discharge: HOME | End: 2018-03-01
Payer: COMMERCIAL

## 2018-03-01 VITALS
HEIGHT: 62 IN | DIASTOLIC BLOOD PRESSURE: 68 MMHG | HEART RATE: 75 BPM | WEIGHT: 152 LBS | OXYGEN SATURATION: 97 % | BODY MASS INDEX: 27.97 KG/M2 | TEMPERATURE: 98 F | SYSTOLIC BLOOD PRESSURE: 100 MMHG | RESPIRATION RATE: 18 BRPM

## 2018-03-01 DIAGNOSIS — Z12.39 SCREENING FOR BREAST CANCER: ICD-10-CM

## 2018-03-01 DIAGNOSIS — Z00.00 ROUTINE GENERAL MEDICAL EXAMINATION AT A HEALTH CARE FACILITY: ICD-10-CM

## 2018-03-01 DIAGNOSIS — D17.24 LIPOMA OF LEFT LOWER EXTREMITY: ICD-10-CM

## 2018-03-01 DIAGNOSIS — Z78.0 POSTMENOPAUSAL: ICD-10-CM

## 2018-03-01 DIAGNOSIS — E53.8 B12 DEFICIENCY: ICD-10-CM

## 2018-03-01 DIAGNOSIS — M81.0 AGE-RELATED OSTEOPOROSIS WITHOUT CURRENT PATHOLOGICAL FRACTURE: ICD-10-CM

## 2018-03-01 DIAGNOSIS — Z01.419 WELL FEMALE EXAM WITH ROUTINE GYNECOLOGICAL EXAM: Primary | ICD-10-CM

## 2018-03-01 DIAGNOSIS — E03.9 ACQUIRED HYPOTHYROIDISM: ICD-10-CM

## 2018-03-01 LAB
ALBUMIN SERPL-MCNC: 4 G/DL (ref 3.4–5)
ALBUMIN/GLOB SERPL: 1.6 {RATIO} (ref 0.8–1.7)
ALP SERPL-CCNC: 72 U/L (ref 45–117)
ALT SERPL-CCNC: 25 U/L (ref 13–56)
ANION GAP SERPL CALC-SCNC: 8 MMOL/L (ref 3–18)
AST SERPL-CCNC: 12 U/L (ref 15–37)
BILIRUB SERPL-MCNC: 0.5 MG/DL (ref 0.2–1)
BUN SERPL-MCNC: 9 MG/DL (ref 7–18)
BUN/CREAT SERPL: 12 (ref 12–20)
CALCIUM SERPL-MCNC: 8.6 MG/DL (ref 8.5–10.1)
CHLORIDE SERPL-SCNC: 106 MMOL/L (ref 100–108)
CHOLEST SERPL-MCNC: 208 MG/DL
CO2 SERPL-SCNC: 28 MMOL/L (ref 21–32)
CREAT SERPL-MCNC: 0.76 MG/DL (ref 0.6–1.3)
ERYTHROCYTE [DISTWIDTH] IN BLOOD BY AUTOMATED COUNT: 13.2 % (ref 11.6–14.5)
GLOBULIN SER CALC-MCNC: 2.5 G/DL (ref 2–4)
GLUCOSE SERPL-MCNC: 93 MG/DL (ref 74–99)
HCT VFR BLD AUTO: 41 % (ref 35–45)
HDLC SERPL-MCNC: 70 MG/DL (ref 40–60)
HDLC SERPL: 3 {RATIO} (ref 0–5)
HGB BLD-MCNC: 13.5 G/DL (ref 12–16)
LDLC SERPL CALC-MCNC: 119.8 MG/DL (ref 0–100)
LIPID PROFILE,FLP: ABNORMAL
MCH RBC QN AUTO: 29.7 PG (ref 24–34)
MCHC RBC AUTO-ENTMCNC: 32.9 G/DL (ref 31–37)
MCV RBC AUTO: 90.3 FL (ref 74–97)
PLATELET # BLD AUTO: 278 K/UL (ref 135–420)
PMV BLD AUTO: 10.6 FL (ref 9.2–11.8)
POTASSIUM SERPL-SCNC: 4 MMOL/L (ref 3.5–5.5)
PROT SERPL-MCNC: 6.5 G/DL (ref 6.4–8.2)
RBC # BLD AUTO: 4.54 M/UL (ref 4.2–5.3)
SODIUM SERPL-SCNC: 142 MMOL/L (ref 136–145)
T3FREE SERPL-MCNC: 3.6 PG/ML (ref 2.3–4.2)
T4 FREE SERPL-MCNC: 1.4 NG/DL (ref 0.7–1.5)
TRIGL SERPL-MCNC: 91 MG/DL (ref ?–150)
TSH SERPL DL<=0.05 MIU/L-ACNC: 0.64 UIU/ML (ref 0.36–3.74)
VIT B12 SERPL-MCNC: 479 PG/ML (ref 211–911)
VLDLC SERPL CALC-MCNC: 18.2 MG/DL
WBC # BLD AUTO: 5.6 K/UL (ref 4.6–13.2)

## 2018-03-01 PROCEDURE — 82607 VITAMIN B-12: CPT | Performed by: INTERNAL MEDICINE

## 2018-03-01 PROCEDURE — 84439 ASSAY OF FREE THYROXINE: CPT | Performed by: INTERNAL MEDICINE

## 2018-03-01 PROCEDURE — 84443 ASSAY THYROID STIM HORMONE: CPT | Performed by: INTERNAL MEDICINE

## 2018-03-01 PROCEDURE — 36415 COLL VENOUS BLD VENIPUNCTURE: CPT | Performed by: INTERNAL MEDICINE

## 2018-03-01 PROCEDURE — 80061 LIPID PANEL: CPT | Performed by: INTERNAL MEDICINE

## 2018-03-01 PROCEDURE — 88175 CYTOPATH C/V AUTO FLUID REDO: CPT | Performed by: INTERNAL MEDICINE

## 2018-03-01 PROCEDURE — 85027 COMPLETE CBC AUTOMATED: CPT | Performed by: INTERNAL MEDICINE

## 2018-03-01 PROCEDURE — 80053 COMPREHEN METABOLIC PANEL: CPT | Performed by: INTERNAL MEDICINE

## 2018-03-01 PROCEDURE — 84481 FREE ASSAY (FT-3): CPT | Performed by: INTERNAL MEDICINE

## 2018-03-01 RX ORDER — CITALOPRAM 20 MG/1
TABLET, FILM COATED ORAL DAILY
COMMUNITY
End: 2018-04-09

## 2018-03-01 NOTE — PROGRESS NOTES
Assessment/Plan:    1. Well female exam with routine gynecological exam  - PAP IG, RFX APTIMA HPV ASCUS ([id number])); Future    2. Acquired hypothyroidism  - TSH 3RD GENERATION; Future  - T4, FREE; Future  - T3, FREE; Future    3. B12 deficiency  - VITAMIN B12; Future    4. Age-related osteoporosis without current pathological fracture, postmenopausal  -pt to consider reclast infusion. Will get dexa. - METABOLIC PANEL, COMPREHENSIVE; Future  - DEXA BONE DENSITY STUDY AXIAL; Future    5. Routine general medical examination at a health care facility  - METABOLIC PANEL, COMPREHENSIVE; Future  - LIPID PANEL; Future  - CBC W/O DIFF; Future    6. Lipoma of left lower extremity-now with sx, will refer to surgery for removal  - [facility]    7. Screening for breast cancer  - [facility] MAMMO BI SCREENING INCL CAD; Future    The plan was discussed with the patient. The patient verbalized understanding and is in agreement with the plan. All medication potential side effects were discussed with the patient. Health Maintenance: pt reminded to do stool test.  Health Maintenance   Topic Date Due    FOBT Q 1 YEAR AGE 50-75  08/23/2005    BREAST CANCER SCRN MAMMOGRAM  03/21/2019    PAP AKA CERVICAL CYTOLOGY  03/01/2021    DTaP/Tdap/Td series (2 - Td) 02/28/2027    Hepatitis C Screening  Addressed    ZOSTER VACCINE AGE 60>  Addressed    Influenza Age 5 to Adult  Completed     Curt Olson is a 58 y.o. female and presents with Osteoporosis; Medication Evaluation (Prolia); and Hypothyroidism     Subjective:  Osteoporosis - pt was finally able to get prolia after long insurance prior auth issues. However, she is concerned about potential side effects. She wants to consider reclast IV infusion given her barretts esophagus and intol of fosamax. Hypothyroid - due for labs. Pt c/o lipoma on her L buttock. States it's painful when she exercises or sleeps on that side.      ROS:  Constitutional: No recent weight change. No weakness/fatigue. No f/c. Cardiovascular: No CP/palpitations. No ORELLANA/orthopnea/PND. Respiratory: No cough/sputum, dyspnea, wheezing. Gastointestinal: No dysphagia, reflux. No n/v. No constipation/diarrhea. No melena/rectal bleeding. Genitourinary: No dysuria, urinary hesitancy, nocturia, hematuria. No incontinence. The problem list was updated as a part of today's visit. Patient Active Problem List   Diagnosis Code    Osteoporosis M81.0    Acquired hypothyroidism E03.9    History of anemia Z86.2    B12 deficiency E53.8    History of positive PPD R76.11    Tinnitus H93.19    Interstitial cystitis N30.10    Menopausal symptom N95.1    Bunion of great toe of right foot M21.611    Ponce's esophagus with dysplasia K22.719       The PSH, FH were reviewed. SH:  Social History   Substance Use Topics    Smoking status: Never Smoker    Smokeless tobacco: Never Used    Alcohol use No       Medications/Allergies:  Current Outpatient Prescriptions on File Prior to Visit   Medication Sig Dispense Refill    fluvoxaMINE (LUVOX) 50 mg tablet TAKE ONE TABLET BY MOUTH EVERY NIGHT AT BEDTIME (GENERIC FOR LUVOX) 90 Tab 1    ELMIRON 100 mg capsule TAKE ONE CAPSULE BY MOUTH BEFORE MEALS - BREAKFAST LUNCH AND DINNER 270 Cap 1    progesterone (PROMETRIUM) 100 mg capsule TAKE ONE CAPSULE BY MOUTH DAILY FOR TWO WEEKS, THEN TAKE 2 WEEKS OFF 42 Cap 2    denosumab (PROLIA) 60 mg/mL injection 1 mL by SubCUTAneous route every 6 months. 1 mL 1    estradiol (VIVELLE-DOT) 0.025 mg/24 hr ptsw Apply twice weekly 24 Patch 1    cyanocobalamin (VITAMIN B-12) 1,000 mcg/mL injection 1 mL by IntraMUSCular route every thirty (30) days. 10 mL 0    thyroid, Pork, (ARMOUR THYROID) 15 mg tablet Take 1 Tab by mouth two (2) times a day. 180 Tab 3    levothyroxine (SYNTHROID) 75 mcg tablet Take 1 Tab by mouth Daily (before breakfast).  90 Tab 3    estradiol (ESTRACE) 0.01 % (0.1 mg/gram) vaginal cream Insert 2 g into vagina daily.  co-enzyme Q-10 (CO Q-10) 50 mg capsule Take 50 mg by mouth daily.  prasterone, dhea,-calcium carb (DHEA) 10 mg-47 mg calcium tab Take  by mouth.  multivitamin with iron (DAILY MULTI-VITAMINS/IRON) tablet Take 1 Tab by mouth daily. No current facility-administered medications on file prior to visit. Allergies   Allergen Reactions    Penicillins Anaphylaxis    Codeine Rash     Objective:  Visit Vitals    /68 (BP 1 Location: Left arm, BP Patient Position: Sitting)    Pulse 75    Temp 98 °F (36.7 °C) (Oral)    Resp 18    Ht 5' 1.5\" (1.562 m)    Wt 152 lb (68.9 kg)    SpO2 97%    BMI 28.26 kg/m2      Constitutional: Well developed, nourished, no distress, alert   CV: S1, S2.  RRR. No murmurs/rubs. No thrills palpated. No carotid bruits. Intact distal pulses. No edema. Pulm: No abnormalities on inspection. Clear to auscultation bilaterally. No wheezing/rhonchi. Normal effort. GI: Soft, nontender, nondistended. Normal active bowel sounds. Neuro: A/O x 3. No focal motor or sensory deficits. Speech normal.   Skin: No lesions/rashes on inspection. +2-3cm fleshy mass on L lateral hip/buttock - nontender. Psych: Appropriate affect, judgement and insight. Short-term memory intact. Pelvic exam: normal external genitalia, vulva, vagina, cervix, uterus and adnexa.

## 2018-03-01 NOTE — PROGRESS NOTES
Bao Diaz is a 58 y.o. female (: 1955) presenting to address:    Chief Complaint   Patient presents with    Osteoporosis    Medication Evaluation     Prolia    Hypothyroidism       Vitals:    18 1128   BP: 100/68   Pulse: 75   Resp: 18   Temp: 98 °F (36.7 °C)   TempSrc: Oral   SpO2: 97%   Weight: 152 lb (68.9 kg)   Height: 5' 1.5\" (1.562 m)   PainSc:   5   PainLoc: Buttocks       Learning Assessment:     Learning Assessment 2017   PRIMARY LEARNER Patient   HIGHEST LEVEL OF EDUCATION - PRIMARY LEARNER  4 YEARS OF COLLEGE   PRIMARY LANGUAGE ENGLISH   LEARNER PREFERENCE PRIMARY DEMONSTRATION     PICTURES   ANSWERED BY Self   RELATIONSHIP SELF     Depression Screening:     PHQ over the last two weeks 2017   PHQ Not Done Medical Reason (indicate in comments)   Little interest or pleasure in doing things -   Feeling down, depressed or hopeless -   Total Score PHQ 2 -     Fall Risk Assessment:     Fall Risk Assessment, last 12 mths 10/24/2017   Able to walk? Yes   Fall in past 12 months? No     Abuse Screening:     Abuse Screening Questionnaire 10/24/2017   Do you ever feel afraid of your partner? N   Are you in a relationship with someone who physically or mentally threatens you? N   Is it safe for you to go home? Y     Coordination of Care Questionaire:   1. Have you been to the ER, urgent care clinic since your last visit? Hospitalized since your last visit? NO    2. Have you seen or consulted any other health care providers outside of the [address] [place] [address] since your last visit? Include any pap smears or colon screening. NO    Advanced Directive:   1. Do you have an Advanced Directive? YES    2. Would you like information on Advanced Directives?  NO

## 2018-03-01 NOTE — PATIENT INSTRUCTIONS
Zoledronic Acid (By injection)   Zoledronic Acid (szg-lh-MIUO-ik AS-id)  Treats high blood calcium levels. Also treats bone damage caused by Paget disease, multiple myeloma, and cancers that spread to the bone. Also treats osteoporosis and reduces the risk of hip fractures in certain patients. Brand Name(s): PremierPro Rx Zoledronic Acid, Reclast, Zoledronic Acid Novaplus, Zometa   There may be other brand names for this medicine. When This Medicine Should Not Be Used: This medicine is not right for everyone. You should not receive it if you had an allergic reaction to zoledronic acid, or if you are pregnant. How to Use This Medicine:   Injectable  · A nurse or other health provider will give you this medicine. · Your doctor will prescribe your dose and schedule. This medicine is given through a needle placed in a vein. · Your doctor may tell you to drink extra liquids before your treatment to prevent kidney problems. · Your doctor may also give you vitamin D and calcium supplements. Tell your doctor if you are not able to take these medicines. · This medicine should come with a Medication Guide. Ask your pharmacist for a copy if you do not have one. · Missed dose: You must use this medicine on a fixed schedule. Call your doctor or pharmacist if you miss a dose. Drugs and Foods to Avoid:   Ask your doctor or pharmacist before using any other medicine, including over-the-counter medicines, vitamins, and herbal products. · Do not use other medicines that also contain zoledronic acid. Do not use zoledronic acid together with another bisphosphonate medicine. · Some foods and medicines can affect how zoledronic acid works. Tell your doctor if you are using digoxin, antibiotics, diuretics (water pills), an NSAID pain or arthritis medicine (such as aspirin, celecoxib, ibuprofen, naproxen), steroid medicines, or cancer medicines.   Warnings While Using This Medicine:   · It is not safe to take this medicine during pregnancy. It could harm an unborn baby. Tell your doctor right away if you become pregnant. · Tell your doctor if you are breastfeeding, or if you have kidney disease, anemia, aspirin-sensitive asthma, bleeding problems, cancer, congestive heart failure, low blood calcium levels, stomach absorption problems, mineral imbalance, dental problems or gum disease. Also tell your doctor if you had surgery on your bowel or parathyroid or thyroid gland. · This medicine may cause the following problems:  ¨ Jaw or teeth problems  ¨ Severe bone, joint, or muscle pain  ¨ Increased risk of thigh bone fracture  ¨ Low calcium levels in your blood  · You must have regular dental exams while you are being treated with this medicine. Tell your dentist or oral surgeon that you are using this medicine. · Your doctor will do lab tests at regular visits to check on the effects of this medicine. Keep all appointments.   Possible Side Effects While Using This Medicine:   Call your doctor right away if you notice any of these side effects:  · Allergic reaction: Itching or hives, swelling in your face or hands, swelling or tingling in your mouth or throat, chest tightness, trouble breathing  · Chest pain, trouble breathing, fast or uneven heartbeat  · Decrease in how much or how often you urinate, blood in the urine, lower back or side pain, burning or painful urination  · Muscle spasm or twitching, or numbness or tingling in your fingers, feet, or around your mouth  · Pain, swelling, or numbness in the mouth or jaw, loose teeth or other teeth problems  · Severe muscle, bone, or joint pain  · Unusual pain in your thigh, groin, or hip  If you notice these less serious side effects, talk with your doctor:   · Fever, chills, cough, sore throat, and body aches  · Headache  · Mild nausea, constipation, diarrhea, stomach pain or upset  · Redness, pain, or swelling of your skin where the needle is placed  If you notice other side effects that you think are caused by this medicine, tell your doctor. Call your doctor for medical advice about side effects. You may report side effects to FDA at 9-100-LIU-0856  © 2017 [address] [employer] Information is for End User's use only and may not be sold, redistributed or otherwise used for commercial purposes. The above information is an  only. It is not intended as medical advice for individual conditions or treatments. Talk to your doctor, nurse or pharmacist before following any medical regimen to see if it is safe and effective for you.

## 2018-03-01 NOTE — MR AVS SNAPSHOT
"Patient seen in ER last evening for \"fractured penis\", no provider note is in EPIC  Patient states that he tried calling [facility] where he was referred and they are not answering their phone today, is wanting to make an appointment with urology asap as he has read on the internet that with this type of condition you have to have surgery within 72 hours.   Patient states that he is urinating normally, no blood noted from the penis.   States that the right side of the penis is swollen, has been applying ice.   Denies fever.    Protocol and care advice reviewed.   Caller states understanding of the recommended disposition, advised to call [facility] tomorrow am for same day appointment.   Advised to call back if further questions or concerns.     Reason for Disposition    All other penis - scrotum symptoms  (Exception: painless rash < 24 hours duration)    Additional Information    Negative: [1] Blood from end of penis AND [2] large amount    Negative: [1] Not circumcised AND [2] foreskin pulled back and stuck    Negative: [1] Looks infected (e.g., draining sore, ulcer, rash is painful to touch) AND [2] fever > 100.5 F (38.1 C)    Negative: [1] Unable to urinate (or only a few drops) > 4 hours AND     [2] bladder feels very full (e.g., palpable bladder or strong urge to urinate)    Negative: [1] Erection AND [2] present > 4 hours    Negative: [1] Pus or bloody discharge from the end of the penis AND [2] fever    Negative: [1] Pain or burning with passing urine AND [2] fever > 100.5 F (38.1 C)    Negative: [1] Pain or burning with passing urine AND [2] side (flank) or back pain present    Negative: Entire penis is swollen (i.e., edema)    Negative: Blood in urine (red, pink, or tea-colored)    Negative: Pain or burning with passing urine    Negative: Pus (white, yellow) or bloody discharge from end of penis    Protocols used: PENIS AND SCROTUM SYMPTOMS-ADULT-AH    " [address] [place] [address] [address] [facility] [id number] [phone] Patient: Shani Rahman MRN: [id number] YEM:[birth year] Visit Information Date & Time Provider Department Dept. Phone Encounter #  
 3/1/2018 11:30 AM Meño Tovar, [facility] [place] [phone] [id number] Upcoming Health Maintenance Date Due FOBT Q 1 YEAR AGE 50-75 8/23/2005 BREAST CANCER SCRN MAMMOGRAM 3/21/2019 PAP AKA CERVICAL CYTOLOGY 3/1/2021 DTaP/Tdap/Td series (2 - Td) 2/28/2027 Allergies as of 3/1/2018  Review Complete On: 3/1/2018 By: Meño Tovar MD  
  
 Severity Noted Reaction Type Reactions Penicillins High 02/28/2017    Anaphylaxis Codeine  02/28/2017    Rash Current Immunizations  Reviewed on 2/28/2017 Name Date Influenza Vaccine (Quad) PF 10/24/2017 11:26 AM  
 Tdap 2/28/2017  2:03 PM  
  
 Not reviewed this visit You Were Diagnosed With   
  
 Codes Comments Well female exam with routine gynecological exam    -  Primary ICD-10-CM: G29.414 ICD-9-CM: V72.31 Acquired hypothyroidism     ICD-10-CM: E03.9 ICD-9-CM: 244.9 B12 deficiency     ICD-10-CM: E53.8 ICD-9-CM: 266.2 Age-related osteoporosis without current pathological fracture     ICD-10-CM: M81.0 ICD-9-CM: 733.01 Routine general medical examination at a health care facility     ICD-10-CM: Z00.00 ICD-9-CM: V70.0 Lipoma of left lower extremity     ICD-10-CM: D17.24 ICD-9-CM: 214.1 Screening for breast cancer     ICD-10-CM: Z12.31 
ICD-9-CM: V76.10 Postmenopausal     ICD-10-CM: Z78.0 ICD-9-CM: V49.81 Vitals BP Pulse Temp Resp Height(growth percentile) Weight(growth percentile) 100/68 (BP 1 Location: Left arm, BP Patient Position: Sitting) 75 98 °F (36.7 °C) (Oral) 18 5' 1.5\" (1.562 m) 152 lb (68.9 kg) SpO2 BMI OB Status Smoking Status 97% 28.26 kg/m2 Postmenopausal Never Smoker Vitals History BMI and BSA Data Body Mass Index Body Surface Area  
 28.26 kg/m 2 1.73 m 2 Preferred Pharmacy Pharmacy Name Phone [facility] [address] Your Updated Medication List  
  
   
This list is accurate as of 3/1/18 11:55 AM.  Always use your most recent med list.  
  
  
  
  
 CeleXA 20 mg tablet Generic drug:  citalopram  
Take  by mouth daily. CO Q-10 50 mg capsule Generic drug:  co-enzyme Q-10 Take 50 mg by mouth daily. cyanocobalamin 1,000 mcg/mL injection Commonly known as:  VITAMIN B-12  
1 mL by IntraMUSCular route every thirty (30) days. DAILY MULTI-VITAMINS/IRON tablet Generic drug:  multivitamin with iron Take 1 Tab by mouth daily. DHEA 10 mg-47 mg calcium Tab Generic drug:  prasterone (dhea)-calcium carb Take  by mouth. ELMIRON 100 mg capsule Generic drug:  pentosan polysulfate sodium TAKE ONE CAPSULE BY MOUTH BEFORE MEALS - BREAKFAST LUNCH AND DINNER  
  
 * ESTRACE 0.01 % (0.1 mg/gram) vaginal cream  
Generic drug:  estradiol Insert 2 g into vagina daily. * estradiol 0.025 mg/24 hr Ptsw Commonly known as:  VIVELLE-DOT Apply twice weekly  
  
 fluvoxaMINE 50 mg tablet Commonly known as:  Donalee Number TAKE ONE TABLET BY MOUTH EVERY NIGHT AT BEDTIME ([address]) levothyroxine 75 mcg tablet Commonly known as:  SYNTHROID Take 1 Tab by mouth Daily (before breakfast). progesterone 100 mg capsule Commonly known as:  PROMETRIUM  
TAKE ONE CAPSULE BY MOUTH DAILY FOR TWO WEEKS, THEN TAKE 2 WEEKS OFF  
  
 thyroid (Pork) 15 mg tablet Commonly known as:  ARMOUR THYROID Take 1 Tab by mouth two (2) times a day. * Notice: This list has 2 medication(s) that are the same as other medications prescribed for you. Read the directions carefully, and ask your doctor or other care provider to review them with you. We Performed the Following REFERRAL TO SURGERY [XXD148 Custom] To-Do List   
 03/01/2018 Lab:  CBC W/O DIFF   
  
 03/01/2018 Imaging:  DEXA BONE DENSITY STUDY AXIAL   
  
 03/01/2018 Lab:  LIPID PANEL   
  
 03/01/2018 Imaging:  JOHANN MAMMO BI SCREENING INCL CAD   
  
 03/01/2018 Lab:  METABOLIC PANEL, COMPREHENSIVE   
  
 03/01/2018 Pathology:  PAP IG, RFX APTIMA HPV ASCUS ([id number])) 03/01/2018 Lab:  T3, FREE   
  
 03/01/2018 Lab:  T4, FREE   
  
 03/01/2018 Lab:  TSH 3RD GENERATION   
  
 03/01/2018 Lab:  VITAMIN B12 Referral Information Referral ID Referred By Referred To  
  
 [id number] [facility], [address] [facility] MD Dima   
   [address] [place] [address] Phone: [phone] Fax: [phone] Visits Status Start Date End Date 1 New Request 3/1/18 3/1/19 If your referral has a status of pending review or denied, additional information will be sent to support the outcome of this decision. Referral ID Referred By Referred To  
 [id number] Leigh Torres Not Available Visits Status Start Date End Date 1 New Request 3/1/18 3/1/19 If your referral has a status of pending review or denied, additional information will be sent to support the outcome of this decision. Patient Instructions Zoledronic Acid (By injection) Zoledronic Acid (aws-ly-NNMG-ik AS-id) Treats high blood calcium levels. Also treats bone damage caused by Paget disease, multiple myeloma, and cancers that spread to the bone. Also treats osteoporosis and reduces the risk of hip fractures in certain patients. Brand Name(s): PremierPro Rx Zoledronic Acid, Reclast, Zoledronic Acid Novaplus, Zometa There may be other brand names for this medicine. When This Medicine Should Not Be Used: This medicine is not right for everyone.  You should not receive it if you had an allergic reaction to zoledronic acid, or if you are pregnant. How to Use This Medicine:  
Injectable · A nurse or other health provider will give you this medicine. · Your doctor will prescribe your dose and schedule. This medicine is given through a needle placed in a vein. · Your doctor may tell you to drink extra liquids before your treatment to prevent kidney problems. · Your doctor may also give you vitamin D and calcium supplements. Tell your doctor if you are not able to take these medicines. · This medicine should come with a Medication Guide. Ask your pharmacist for a copy if you do not have one. · Missed dose: You must use this medicine on a fixed schedule. Call your doctor or pharmacist if you miss a dose. Drugs and Foods to Avoid: Ask your doctor or pharmacist before using any other medicine, including over-the-counter medicines, vitamins, and herbal products. · Do not use other medicines that also contain zoledronic acid. Do not use zoledronic acid together with another bisphosphonate medicine. · Some foods and medicines can affect how zoledronic acid works. Tell your doctor if you are using digoxin, antibiotics, diuretics (water pills), an NSAID pain or arthritis medicine (such as aspirin, celecoxib, ibuprofen, naproxen), steroid medicines, or cancer medicines. Warnings While Using This Medicine: · It is not safe to take this medicine during pregnancy. It could harm an unborn baby. Tell your doctor right away if you become pregnant. · Tell your doctor if you are breastfeeding, or if you have kidney disease, anemia, aspirin-sensitive asthma, bleeding problems, cancer, congestive heart failure, low blood calcium levels, stomach absorption problems, mineral imbalance, dental problems or gum disease. Also tell your doctor if you had surgery on your bowel or parathyroid or thyroid gland. · This medicine may cause the following problems: ¨ Jaw or teeth problems ¨ Severe bone, joint, or muscle pain ¨ Increased risk of thigh bone fracture ¨ Low calcium levels in your blood · You must have regular dental exams while you are being treated with this medicine. Tell your dentist or oral surgeon that you are using this medicine. · Your doctor will do lab tests at regular visits to check on the effects of this medicine. Keep all appointments. Possible Side Effects While Using This Medicine:  
Call your doctor right away if you notice any of these side effects: · Allergic reaction: Itching or hives, swelling in your face or hands, swelling or tingling in your mouth or throat, chest tightness, trouble breathing · Chest pain, trouble breathing, fast or uneven heartbeat · Decrease in how much or how often you urinate, blood in the urine, lower back or side pain, burning or painful urination · Muscle spasm or twitching, or numbness or tingling in your fingers, feet, or around your mouth · Pain, swelling, or numbness in the mouth or jaw, loose teeth or other teeth problems · Severe muscle, bone, or joint pain · Unusual pain in your thigh, groin, or hip If you notice these less serious side effects, talk with your doctor: · Fever, chills, cough, sore throat, and body aches · Headache · Mild nausea, constipation, diarrhea, stomach pain or upset · Redness, pain, or swelling of your skin where the needle is placed If you notice other side effects that you think are caused by this medicine, tell your doctor. Call your doctor for medical advice about side effects. You may report side effects to FDA at 8-292-VPY-9636 © 2017 [facility] Information is for End User's use only and may not be sold, redistributed or otherwise used for commercial purposes. The above information is an  only. It is not intended as medical advice for individual conditions or treatments.  Talk to your doctor, nurse or pharmacist before following any medical regimen to see if it is safe and effective for you. Introducing [facility]! Dear Hyun Mcmahan: Thank you for requesting a [employer] account. Our records indicate that you already have an active [employer] account. You can access your account anytime at [email or web address] Did you know that you can access your hospital and ER discharge instructions at any time in [employer]? You can also review all of your test results from your hospital stay or ER visit. Additional Information If you have questions, please visit the Frequently Asked Questions section of the [employer] website at [email or web address] Remember, [employer] is NOT to be used for urgent needs. For medical emergencies, dial 911. Now available from your iPhone and Android! Please provide this summary of care documentation to your next provider. Your primary care clinician is listed as Ana Cook. If you have any questions after today's visit, please call [phone].

## 2018-03-02 ENCOUNTER — TELEPHONE (OUTPATIENT)
Dept: SURGERY | Age: 63
End: 2018-03-02

## 2018-03-02 NOTE — TELEPHONE ENCOUNTER
Called the patient at [phone] and left a message for her to call our office to schedule an appointment with Dr. Rylie Newsome  The patient has been referred to us by Dr. Dominique Yarbrough to evaluate and treat Lipoma of left lower extremity.

## 2018-03-07 ENCOUNTER — OFFICE VISIT (OUTPATIENT)
Dept: SURGERY | Age: 63
End: 2018-03-07

## 2018-03-07 VITALS
OXYGEN SATURATION: 99 % | WEIGHT: 155.4 LBS | RESPIRATION RATE: 16 BRPM | TEMPERATURE: 95.4 F | BODY MASS INDEX: 28.6 KG/M2 | DIASTOLIC BLOOD PRESSURE: 57 MMHG | HEIGHT: 62 IN | HEART RATE: 65 BPM | SYSTOLIC BLOOD PRESSURE: 113 MMHG

## 2018-03-07 DIAGNOSIS — R22.42 HIP MASS, LEFT: Primary | ICD-10-CM

## 2018-03-07 NOTE — PATIENT INSTRUCTIONS
If you have any questions or concerns about today's appointment, the verbal and/or written instructions you were given for follow up care, please call our office at [phone]. [facility]  [id number] [place] [address], [address]    [phone] office  [phone] fax    . David Pillar PATIENT PRE AND POST OPERATIVE INSTRUCTIONS     [facility], [address]  [phone]    Before Surgery Instructions:   1) You must have someone available to drive you to and from your procedure and stay with you for the first 24 hours. 2) It is very important that you have nothing to eat or drink after midnight the night before your surgery. This includes chewing gum or sucking on hard candy. Take only heart, blood pressure and cholesterol medications the morning of surgery with only a sip of water. 3) Please stop taking Plavix 10 days prior to your surgery. Stop taking Coumadin 5 days prior to your surgery. Stop taking all Aspirin or Aspirin containing products 7 days prior to your surgery. Stop taking Advil, Motrin, Aleve, and etc. 3 days prior to your surgery. 4) If you take any diabetic medications please consult with your primary care physician on how to take them on the day of your surgery  5) Please stop all Herbal products 2 weeks prior to your surgery. 6) Please arrive at the hospital 2 hours prior to your surgery, unless you have been otherwise instructed. 7) Patients having an operation on their colon will be given a separate instruction sheet on their Bowel Prep. 8) For any pre-operative work up check in at the main entrance to [facility], and then go to Patient Registration. These studies are done on a walk in basis they are open from 7:00am to 5:00pm Monday through Friday. 9) Please wash your surgical site the morning of your surgery with soap and water.   10) If you are of child bearing age you will have pregnancy test done the morning of your surgery as soon as you arrive. 11) You may be contacted to change your surgery time. At times this is necessary due to equipment or staffing needs. After Surgery Instructions: You will need to be seen in the office for a follow-up visit 7-14 days after your surgery. Please call after you have had the procedure to make this appointment. Unless otherwise instructed, you may remove your outer bandage and shower 48 hours after your surgery. If you develop a fever greater than 101, have any significant drainage, bleeding, swelling and/or pus of the wound. Please call our office immediately. Surgery Date and Time:  Thursday, April 12, 2018 at 7:30am    Please check in at [facility], enter through the Emergency Room entrance and go up to the second floor. Please check in by 6:00am the day of your surgery. You may contact [facility] with any questions at [phone].

## 2018-03-07 NOTE — PROGRESS NOTES
General Surgery Consult    Pascual Rivera  Admit date: (Not on file)    MRN: [id number]     : 1955     Age: 58 y.o. Attending Physician: Chase Morris MD, [facility]      History of Present Illness:      Pascual Rivera is a 58 y.o. female who presented with a left hip mass. The mass has been present for a couple years, but it has increased in size recently and it is causing pain. The pain is mainly with movement. She denies any fever or chills. She denies any drainage or skin changes.     Patient Active Problem List    Diagnosis Date Noted    Ponce's esophagus with dysplasia 2017    Menopausal symptom 10/24/2017    Bunion of great toe of right foot 10/24/2017    Interstitial cystitis 2017    Osteoporosis 2017    Acquired hypothyroidism 2017    History of anemia 2017    B12 deficiency 2017    History of positive PPD 2017    Tinnitus 2017     Past Medical History:   Diagnosis Date    Depression     Menopause     Osteoarthritis     Thyroid disease     Tinnitus       Past Surgical History:   Procedure Laterality Date    HX BACK SURGERY  2011    discectomy    HX BREAST BIOPSY Right     Results were benign    HX  SECTION        Social History   Substance Use Topics    Smoking status: Never Smoker    Smokeless tobacco: Never Used    Alcohol use No      History   Smoking Status    Never Smoker   Smokeless Tobacco    Never Used     Family History   Problem Relation Age of Onset    Osteoporosis Mother     Heart Disease Mother     Hypertension Mother     Arthritis-osteo Mother     Asthma Father     Hypertension Father     Cancer Father 67     colon    Osteoporosis Sister     Thyroid Disease Sister     Arthritis-osteo Sister     Bipolar Disorder Brother     Cancer Maternal Grandmother      gastric    Cancer Paternal Grandmother      cervical      Current Outpatient Prescriptions   Medication Sig    citalopram (CELEXA) 20 mg tablet Take  by mouth daily.  fluvoxaMINE (LUVOX) 50 mg tablet TAKE ONE TABLET BY MOUTH EVERY NIGHT AT BEDTIME (GENERIC FOR LUVOX)    ELMIRON 100 mg capsule TAKE ONE CAPSULE BY MOUTH BEFORE MEALS - BREAKFAST LUNCH AND DINNER    progesterone (PROMETRIUM) 100 mg capsule TAKE ONE CAPSULE BY MOUTH DAILY FOR TWO WEEKS, THEN TAKE 2 WEEKS OFF    estradiol (VIVELLE-DOT) 0.025 mg/24 hr ptsw Apply twice weekly    cyanocobalamin (VITAMIN B-12) 1,000 mcg/mL injection 1 mL by IntraMUSCular route every thirty (30) days.  thyroid, Pork, (ARMOUR THYROID) 15 mg tablet Take 1 Tab by mouth two (2) times a day.  levothyroxine (SYNTHROID) 75 mcg tablet Take 1 Tab by mouth Daily (before breakfast).  estradiol (ESTRACE) 0.01 % (0.1 mg/gram) vaginal cream Insert 2 g into vagina daily.  co-enzyme Q-10 (CO Q-10) 50 mg capsule Take 50 mg by mouth daily.  prasterone, dhea,-calcium carb (DHEA) 10 mg-47 mg calcium tab Take  by mouth.  multivitamin with iron (DAILY MULTI-VITAMINS/IRON) tablet Take 1 Tab by mouth daily. No current facility-administered medications for this visit. Allergies   Allergen Reactions    Penicillins Anaphylaxis    Codeine Rash          Review of Systems:  Pertinent items are noted in the History of Present Illness.     Objective:     Visit Vitals    /57 (BP 1 Location: Right arm, BP Patient Position: Sitting)    Pulse 65    Temp 95.4 °F (35.2 °C) (Oral)    Resp 16    Ht 5' 1.5\" (1.562 m)    Wt 70.5 kg (155 lb 6.4 oz)    SpO2 99%    BMI 28.89 kg/m2       Physical Exam:      General:  in no apparent distress, alert and oriented times 3                       Left hip: That is a 4 x 2 cm left hip mass localized on the lateral side, that is deep, slightly movable with no overlying skin changes consistent with a deep lipoma           Imaging and Lab Review:     CBC:   Lab Results   Component Value Date/Time    WBC 5.6 03/01/2018 01:47 PM    RBC 4.54 03/01/2018 01:47 PM    HGB 13.5 03/01/2018 01:47 PM    HCT 41.0 03/01/2018 01:47 PM    PLATELET 963 47/28/6564 01:47 PM     BMP:   Lab Results   Component Value Date/Time    Glucose 93 03/01/2018 01:47 PM    Sodium 142 03/01/2018 01:47 PM    Potassium 4.0 03/01/2018 01:47 PM    Chloride 106 03/01/2018 01:47 PM    CO2 28 03/01/2018 01:47 PM    BUN 9 03/01/2018 01:47 PM    Creatinine 0.76 03/01/2018 01:47 PM    Calcium 8.6 03/01/2018 01:47 PM     CMP:  Lab Results   Component Value Date/Time    Glucose 93 03/01/2018 01:47 PM    Sodium 142 03/01/2018 01:47 PM    Potassium 4.0 03/01/2018 01:47 PM    Chloride 106 03/01/2018 01:47 PM    CO2 28 03/01/2018 01:47 PM    BUN 9 03/01/2018 01:47 PM    Creatinine 0.76 03/01/2018 01:47 PM    Calcium 8.6 03/01/2018 01:47 PM    Anion gap 8 03/01/2018 01:47 PM    BUN/Creatinine ratio 12 03/01/2018 01:47 PM    Alk. phosphatase 72 03/01/2018 01:47 PM    Protein, total 6.5 03/01/2018 01:47 PM    Albumin 4.0 03/01/2018 01:47 PM    Globulin 2.5 03/01/2018 01:47 PM    A-G Ratio 1.6 03/01/2018 01:47 PM       No results found for this or any previous visit (from the past 24 hour(s)). images and reports reviewed    Assessment:   Jose Angel Miller is a 58 y.o. female is presenting with left hip mass consistent with a deep lipoma.      Plan:     Scheduled for excision of left hip mass    Please call me if you have any questions (cell phone: [phone])     Signed By: Chiqui Canchola MD     March 7, 2018

## 2018-03-07 NOTE — MR AVS SNAPSHOT
[facility] 
 
 
 [address] [place] [address] [id number] 
[phone] Patient: Julio Cesar Dugan MRN: [id number] LNM:[id number] Visit Information Date & Time Provider Department Dept. Phone Encounter #  
 3/7/2018  9:45 AM MD [facility] [phone] [id number] Your Appointments 4/23/2018  9:30 AM  
POST OP with Kevin Park MD  
[facility] Appt Note: Post op [address] [place] [facility]  
  
   
 [address] [address] [facility] 951 Upcoming Health Maintenance Date Due FOBT Q 1 YEAR AGE 50-75 8/23/2005 BREAST CANCER SCRN MAMMOGRAM 3/21/2018 PAP AKA CERVICAL CYTOLOGY 3/1/2021 DTaP/Tdap/Td series (2 - Td) 2/28/2027 Allergies as of 3/7/2018  Review Complete On: 3/7/2018 By: Claudy Jay Severity Noted Reaction Type Reactions Penicillins High 02/28/2017    Anaphylaxis Codeine  02/28/2017    Rash Current Immunizations  Reviewed on 2/28/2017 Name Date Influenza Vaccine (Quad) PF 10/24/2017 11:26 AM  
 Tdap 2/28/2017  2:03 PM  
  
 Not reviewed this visit Vitals BP Pulse Temp Resp Height(growth percentile) Weight(growth percentile) 113/57 (BP 1 Location: Right arm, BP Patient Position: Sitting) 65 95.4 °F (35.2 °C) (Oral) 16 5' 1.5\" (1.562 m) 155 lb 6.4 oz (70.5 kg) SpO2 BMI OB Status Smoking Status 99% 28.89 kg/m2 Postmenopausal Never Smoker Vitals History BMI and BSA Data Body Mass Index Body Surface Area  
 28.89 kg/m 2 1.75 m 2 Preferred Pharmacy Pharmacy Name Phone [facility] [address] [place] [address] Your Updated Medication List  
  
   
This list is accurate as of 3/7/18 10:26 AM.  Always use your most recent med list.  
  
  
  
 CeleXA 20 mg tablet Generic drug:  citalopram  
Take  by mouth daily. CO Q-10 50 mg capsule Generic drug:  co-enzyme Q-10 Take 50 mg by mouth daily. cyanocobalamin 1,000 mcg/mL injection Commonly known as:  VITAMIN B-12  
1 mL by IntraMUSCular route every thirty (30) days. DAILY MULTI-VITAMINS/IRON tablet Generic drug:  multivitamin with iron Take 1 Tab by mouth daily. DHEA 10 mg-47 mg calcium Tab Generic drug:  prasterone (dhea)-calcium carb Take  by mouth. ELMIRON 100 mg capsule Generic drug:  pentosan polysulfate sodium TAKE ONE CAPSULE BY MOUTH BEFORE MEALS - BREAKFAST LUNCH AND DINNER  
  
 * ESTRACE 0.01 % (0.1 mg/gram) vaginal cream  
Generic drug:  estradiol Insert 2 g into vagina daily. * estradiol 0.025 mg/24 hr Ptsw Commonly known as:  VIVELLE-DOT Apply twice weekly  
  
 fluvoxaMINE 50 mg tablet Commonly known as:  Flaquita Grade TAKE ONE TABLET BY MOUTH EVERY NIGHT AT BEDTIME ([address]) levothyroxine 75 mcg tablet Commonly known as:  SYNTHROID Take 1 Tab by mouth Daily (before breakfast). progesterone 100 mg capsule Commonly known as:  PROMETRIUM  
TAKE ONE CAPSULE BY MOUTH DAILY FOR TWO WEEKS, THEN TAKE 2 WEEKS OFF  
  
 thyroid (Pork) 15 mg tablet Commonly known as:  ARMOUR THYROID Take 1 Tab by mouth two (2) times a day. * Notice: This list has 2 medication(s) that are the same as other medications prescribed for you. Read the directions carefully, and ask your doctor or other care provider to review them with you. Patient Instructions If you have any questions or concerns about today's appointment, the verbal and/or written instructions you were given for follow up care, please call our office at [phone]. [employer] [facility] [address], [address] 
 
[phone] office [phone] fax .. PATIENT PRE AND POST OPERATIVE INSTRUCTIONS [address] [place] [address] 
[phone] Before Surgery Instructions:  
1) You must have someone available to drive you to and from your procedure and stay with you for the first 24 hours. 2) It is very important that you have nothing to eat or drink after midnight the night before your surgery. This includes chewing gum or sucking on hard candy. Take only heart, blood pressure and cholesterol medications the morning of surgery with only a sip of water. 3) Please stop taking Plavix 10 days prior to your surgery. Stop taking Coumadin 5 days prior to your surgery. Stop taking all Aspirin or Aspirin containing products 7 days prior to your surgery. Stop taking Advil, Motrin, Aleve, and etc. 3 days prior to your surgery. 4) If you take any diabetic medications please consult with your primary care physician on how to take them on the day of your surgery 5) Please stop all Herbal products 2 weeks prior to your surgery. 6) Please arrive at the hospital 2 hours prior to your surgery, unless you have been otherwise instructed. 7) Patients having an operation on their colon will be given a separate instruction sheet on their Bowel Prep. 8) For any pre-operative work up check in at the main entrance to [address] [place] [address], and then go to Patient Registration. These studies are done on a walk in basis they are open from 7:00am to 5:00pm Monday through Friday. 9) Please wash your surgical site the morning of your surgery with soap and water. 10) If you are of child bearing age you will have pregnancy test done the morning of your surgery as soon as you arrive. 11) You may be contacted to change your surgery time. At times this is necessary due to equipment or staffing needs. After Surgery Instructions:   
You will need to be seen in the office for a follow-up visit 7-14 days after your surgery. Please call after you have had the procedure to make this appointment. Unless otherwise instructed, you may remove your outer bandage and shower 48 hours after your surgery. If you develop a fever greater than 101, have any significant drainage, bleeding, swelling and/or pus of the wound. Please call our office immediately. Surgery Date and Time:  Thursday, April 12, 2018 at 7:30am 
 
Please check in at [facility], enter through the Emergency Room entrance and go up to the second floor. Please check in by 6:00am the day of your surgery. You may contact Anna Richards with any questions at [phone]. Introducing [facility]! Dear Thelma Reynolds: Thank you for requesting a [employer] account. Our records indicate that you already have an active [employer] account. You can access your account anytime at [email or web address] Did you know that you can access your hospital and ER discharge instructions at any time in [employer]? You can also review all of your test results from your hospital stay or ER visit. Additional Information If you have questions, please visit the Frequently Asked Questions section of the [employer] website at [email or web address] Remember, [employer] is NOT to be used for urgent needs. For medical emergencies, dial 911. Now available from your iPhone and Android! Please provide this summary of care documentation to your next provider. Your primary care clinician is listed as Ana Cook. If you have any questions after today's visit, please call [phone].

## 2018-03-07 NOTE — LETTER
3/7/2018 9:46 AM 
 
Patient:  Walter Roth YOB: 1955 Date of Visit: 3/7/2018 Clark Villagomez, [address] [place] [address] VIA In Basket Dear Clark Villagomez MD, Thank you for referring Ms. Christine Torrez to [place] [address] for evaluation and treatment. Below are the relevant portions of my assessment and plan of care. Thank you very much for your referral of Ms. Christine Torrez. If you have questions, please do not hesitate to call me. I look forward to following Ms. Llamas along with you and will keep you updated as to her progress. Sincerely, Fadia Florentino MD

## 2018-03-07 NOTE — PROGRESS NOTES
Petrona Pearce is a 58 y.o. female who presents for a surgical evaluation of a LLE lipoma, which has been increasing in size. Patient verbally agrees to permit the medical students working in [address] [place] [address] office to observe and participate in surgical care during the appointment today, including, where appropriate, providing direct surgical care to patient under the physicians direct supervision. Patient agrees that he/she has been given the opportunity to refuse to give such consent and may withdraw consent at any time during appointment.

## 2018-03-12 ENCOUNTER — HOSPITAL ENCOUNTER (OUTPATIENT)
Dept: LAB | Age: 63
Discharge: HOME | End: 2018-03-12
Payer: COMMERCIAL

## 2018-03-12 PROCEDURE — 82274 ASSAY TEST FOR BLOOD FECAL: CPT | Performed by: INTERNAL MEDICINE

## 2018-03-17 LAB — HEMOCCULT STL QL IA: NEGATIVE

## 2018-03-20 RX ORDER — LEVOTHYROXINE SODIUM 75 UG/1
75 TABLET ORAL
Qty: 90 TAB | Refills: 3 | Status: SHIPPED | OUTPATIENT
Start: 2018-03-20 | End: 2019-03-27 | Stop reason: SDUPTHER

## 2018-03-20 NOTE — TELEPHONE ENCOUNTER
Patient pharmacy is requesting a med refill of levothyroxine 75 mcg     Last visit: 3/1/18    Last refill: 3/22/17

## 2018-03-21 ENCOUNTER — HOSPITAL ENCOUNTER (OUTPATIENT)
Dept: GENERAL RADIOLOGY | Age: 63
Discharge: HOME | End: 2018-03-21
Attending: INTERNAL MEDICINE
Payer: COMMERCIAL

## 2018-03-21 ENCOUNTER — HOSPITAL ENCOUNTER (OUTPATIENT)
Dept: MAMMOGRAPHY | Age: 63
Discharge: HOME | End: 2018-03-21
Attending: INTERNAL MEDICINE
Payer: COMMERCIAL

## 2018-03-21 DIAGNOSIS — Z78.0 POSTMENOPAUSAL: ICD-10-CM

## 2018-03-21 DIAGNOSIS — Z12.31 VISIT FOR SCREENING MAMMOGRAM: ICD-10-CM

## 2018-03-21 DIAGNOSIS — M81.0 AGE-RELATED OSTEOPOROSIS WITHOUT CURRENT PATHOLOGICAL FRACTURE: ICD-10-CM

## 2018-03-21 PROCEDURE — 77063 BREAST TOMOSYNTHESIS BI: CPT

## 2018-03-21 PROCEDURE — 77080 DXA BONE DENSITY AXIAL: CPT

## 2018-03-23 RX ORDER — LEVOTHYROXINE AND LIOTHYRONINE 9.5; 2.25 UG/1; UG/1
15 TABLET ORAL 2 TIMES DAILY
Qty: 180 TAB | Refills: 3 | Status: SHIPPED | OUTPATIENT
Start: 2018-03-23 | End: 2019-05-16 | Stop reason: SDUPTHER

## 2018-03-23 NOTE — TELEPHONE ENCOUNTER
From: Pascual Rivera  To: Param Gross MD  Sent: 3/22/2018 4:42 PM EDT  Subject: Medication Renewal Request    Original authorizing provider: Param Gross MD    Michelle Vallejo would like a refill of the following medications:  thyroid, Pork, (ARMOUR THYROID) 15 mg tablet Param Gross MD]    Preferred pharmacy: [facility] [address] [place] [address]     Comment:  I received a message yesterday about this refill. I take the [place] Thyroid twice per day. The Pharmacy called because the refill was for once per day. My recent labs are in the normal range and I have been taking it twice per day. Please inform the pharmacy of this dosage on the prescription. Thank you for calling me and the pharmacy.

## 2018-04-10 ENCOUNTER — ANESTHESIA EVENT (OUTPATIENT)
Dept: SURGERY | Age: 63
End: 2018-04-10
Payer: COMMERCIAL

## 2018-04-11 ENCOUNTER — ANESTHESIA (OUTPATIENT)
Dept: SURGERY | Age: 63
End: 2018-04-11
Payer: COMMERCIAL

## 2018-04-11 ENCOUNTER — HOSPITAL ENCOUNTER (OUTPATIENT)
Age: 63
Setting detail: OUTPATIENT SURGERY
Discharge: HOME | End: 2018-04-11
Attending: SURGERY | Admitting: SURGERY
Payer: COMMERCIAL

## 2018-04-11 VITALS
TEMPERATURE: 97.5 F | DIASTOLIC BLOOD PRESSURE: 67 MMHG | BODY MASS INDEX: 28.7 KG/M2 | RESPIRATION RATE: 16 BRPM | HEART RATE: 65 BPM | HEIGHT: 61 IN | OXYGEN SATURATION: 100 % | WEIGHT: 152 LBS | SYSTOLIC BLOOD PRESSURE: 120 MMHG

## 2018-04-11 DIAGNOSIS — R22.42 HIP MASS, LEFT: Primary | ICD-10-CM

## 2018-04-11 PROCEDURE — 74011250636 HC RX REV CODE- 250/636

## 2018-04-11 PROCEDURE — 77030013079 HC BLNKT BAIR HGGR 3M -A: Performed by: ANESTHESIOLOGY

## 2018-04-11 PROCEDURE — 77030008477 HC STYL SATN SLP COVD -A: Performed by: ANESTHESIOLOGY

## 2018-04-11 PROCEDURE — 76210000021 HC REC RM PH II 0.5 TO 1 HR: Performed by: SURGERY

## 2018-04-11 PROCEDURE — 77030018842 HC SOL IRR SOD CL 9% BAXT -A: Performed by: SURGERY

## 2018-04-11 PROCEDURE — 76060000032 HC ANESTHESIA 0.5 TO 1 HR: Performed by: SURGERY

## 2018-04-11 PROCEDURE — 77030032490 HC SLV COMPR SCD KNE COVD -B: Performed by: SURGERY

## 2018-04-11 PROCEDURE — 88304 TISSUE EXAM BY PATHOLOGIST: CPT | Performed by: SURGERY

## 2018-04-11 PROCEDURE — 74011000250 HC RX REV CODE- 250: Performed by: SURGERY

## 2018-04-11 PROCEDURE — 74011000272 HC RX REV CODE- 272: Performed by: SURGERY

## 2018-04-11 PROCEDURE — 74011250636 HC RX REV CODE- 250/636: Performed by: NURSE ANESTHETIST, CERTIFIED REGISTERED

## 2018-04-11 PROCEDURE — 74011000250 HC RX REV CODE- 250

## 2018-04-11 PROCEDURE — 76010000138 HC OR TIME 0.5 TO 1 HR: Performed by: SURGERY

## 2018-04-11 PROCEDURE — 77030011640 HC PAD GRND REM COVD -A: Performed by: SURGERY

## 2018-04-11 PROCEDURE — 74011250637 HC RX REV CODE- 250/637: Performed by: NURSE ANESTHETIST, CERTIFIED REGISTERED

## 2018-04-11 PROCEDURE — 77030002933 HC SUT MCRYL J&J -A: Performed by: SURGERY

## 2018-04-11 PROCEDURE — 74011250636 HC RX REV CODE- 250/636: Performed by: SURGERY

## 2018-04-11 PROCEDURE — 77030008683 HC TU ET CUF COVD -A: Performed by: ANESTHESIOLOGY

## 2018-04-11 PROCEDURE — 77030031139 HC SUT VCRL2 J&J -A: Performed by: SURGERY

## 2018-04-11 PROCEDURE — 76210000063 HC OR PH I REC FIRST 0.5 HR: Performed by: SURGERY

## 2018-04-11 RX ORDER — LIDOCAINE HYDROCHLORIDE 10 MG/ML
0.1 INJECTION INFILTRATION; PERINEURAL AS NEEDED
Status: DISCONTINUED | OUTPATIENT
Start: 2018-04-11 | End: 2018-04-11 | Stop reason: HOSPADM

## 2018-04-11 RX ORDER — ONDANSETRON 2 MG/ML
4 INJECTION INTRAMUSCULAR; INTRAVENOUS ONCE
Status: CANCELLED | OUTPATIENT
Start: 2018-04-11 | End: 2018-04-11

## 2018-04-11 RX ORDER — LIDOCAINE HYDROCHLORIDE 20 MG/ML
INJECTION, SOLUTION EPIDURAL; INFILTRATION; INTRACAUDAL; PERINEURAL AS NEEDED
Status: DISCONTINUED | OUTPATIENT
Start: 2018-04-11 | End: 2018-04-11 | Stop reason: HOSPADM

## 2018-04-11 RX ORDER — FAMOTIDINE 20 MG/1
20 TABLET, FILM COATED ORAL ONCE
Status: COMPLETED | OUTPATIENT
Start: 2018-04-11 | End: 2018-04-11

## 2018-04-11 RX ORDER — SODIUM CHLORIDE 0.9 % (FLUSH) 0.9 %
5-10 SYRINGE (ML) INJECTION AS NEEDED
Status: CANCELLED | OUTPATIENT
Start: 2018-04-11

## 2018-04-11 RX ORDER — SUCCINYLCHOLINE CHLORIDE 20 MG/ML
INJECTION INTRAMUSCULAR; INTRAVENOUS AS NEEDED
Status: DISCONTINUED | OUTPATIENT
Start: 2018-04-11 | End: 2018-04-11 | Stop reason: HOSPADM

## 2018-04-11 RX ORDER — SODIUM CHLORIDE, SODIUM LACTATE, POTASSIUM CHLORIDE, CALCIUM CHLORIDE 600; 310; 30; 20 MG/100ML; MG/100ML; MG/100ML; MG/100ML
25 INJECTION, SOLUTION INTRAVENOUS CONTINUOUS
Status: DISCONTINUED | OUTPATIENT
Start: 2018-04-11 | End: 2018-04-11 | Stop reason: HOSPADM

## 2018-04-11 RX ORDER — CLINDAMYCIN PHOSPHATE 900 MG/50ML
900 INJECTION INTRAVENOUS ONCE
Status: COMPLETED | OUTPATIENT
Start: 2018-04-11 | End: 2018-04-11

## 2018-04-11 RX ORDER — MIDAZOLAM HYDROCHLORIDE 1 MG/ML
INJECTION, SOLUTION INTRAMUSCULAR; INTRAVENOUS AS NEEDED
Status: DISCONTINUED | OUTPATIENT
Start: 2018-04-11 | End: 2018-04-11 | Stop reason: HOSPADM

## 2018-04-11 RX ORDER — SODIUM CHLORIDE, SODIUM LACTATE, POTASSIUM CHLORIDE, CALCIUM CHLORIDE 600; 310; 30; 20 MG/100ML; MG/100ML; MG/100ML; MG/100ML
75 INJECTION, SOLUTION INTRAVENOUS CONTINUOUS
Status: CANCELLED | OUTPATIENT
Start: 2018-04-11 | End: 2018-04-11

## 2018-04-11 RX ORDER — DIPHENHYDRAMINE HYDROCHLORIDE 50 MG/ML
12.5 INJECTION, SOLUTION INTRAMUSCULAR; INTRAVENOUS
Status: CANCELLED | OUTPATIENT
Start: 2018-04-11

## 2018-04-11 RX ORDER — ONDANSETRON 2 MG/ML
INJECTION INTRAMUSCULAR; INTRAVENOUS AS NEEDED
Status: DISCONTINUED | OUTPATIENT
Start: 2018-04-11 | End: 2018-04-11 | Stop reason: HOSPADM

## 2018-04-11 RX ORDER — DEXAMETHASONE SODIUM PHOSPHATE 4 MG/ML
INJECTION, SOLUTION INTRA-ARTICULAR; INTRALESIONAL; INTRAMUSCULAR; INTRAVENOUS; SOFT TISSUE AS NEEDED
Status: DISCONTINUED | OUTPATIENT
Start: 2018-04-11 | End: 2018-04-11 | Stop reason: HOSPADM

## 2018-04-11 RX ORDER — FENTANYL CITRATE 50 UG/ML
INJECTION, SOLUTION INTRAMUSCULAR; INTRAVENOUS AS NEEDED
Status: DISCONTINUED | OUTPATIENT
Start: 2018-04-11 | End: 2018-04-11 | Stop reason: HOSPADM

## 2018-04-11 RX ORDER — NALOXONE HYDROCHLORIDE 0.4 MG/ML
0.1 INJECTION, SOLUTION INTRAMUSCULAR; INTRAVENOUS; SUBCUTANEOUS AS NEEDED
Status: CANCELLED | OUTPATIENT
Start: 2018-04-11

## 2018-04-11 RX ORDER — FENTANYL CITRATE 50 UG/ML
50 INJECTION, SOLUTION INTRAMUSCULAR; INTRAVENOUS
Status: CANCELLED | OUTPATIENT
Start: 2018-04-11

## 2018-04-11 RX ORDER — PROPOFOL 10 MG/ML
INJECTION, EMULSION INTRAVENOUS AS NEEDED
Status: DISCONTINUED | OUTPATIENT
Start: 2018-04-11 | End: 2018-04-11 | Stop reason: HOSPADM

## 2018-04-11 RX ORDER — HYDROMORPHONE HYDROCHLORIDE 2 MG/1
2 TABLET ORAL
Qty: 24 TAB | Refills: 0 | Status: SHIPPED | OUTPATIENT
Start: 2018-04-11 | End: 2019-03-18

## 2018-04-11 RX ADMIN — DEXAMETHASONE SODIUM PHOSPHATE 4 MG: 4 INJECTION, SOLUTION INTRA-ARTICULAR; INTRALESIONAL; INTRAMUSCULAR; INTRAVENOUS; SOFT TISSUE at 07:45

## 2018-04-11 RX ADMIN — CLINDAMYCIN PHOSPHATE 900 MG: 900 INJECTION INTRAVENOUS at 07:42

## 2018-04-11 RX ADMIN — SUCCINYLCHOLINE CHLORIDE 100 MG: 20 INJECTION INTRAMUSCULAR; INTRAVENOUS at 07:38

## 2018-04-11 RX ADMIN — LIDOCAINE HYDROCHLORIDE 60 MG: 20 INJECTION, SOLUTION EPIDURAL; INFILTRATION; INTRACAUDAL; PERINEURAL at 07:38

## 2018-04-11 RX ADMIN — MIDAZOLAM HYDROCHLORIDE 2 MG: 1 INJECTION, SOLUTION INTRAMUSCULAR; INTRAVENOUS at 07:30

## 2018-04-11 RX ADMIN — SODIUM CHLORIDE, SODIUM LACTATE, POTASSIUM CHLORIDE, AND CALCIUM CHLORIDE 25 ML/HR: 600; 310; 30; 20 INJECTION, SOLUTION INTRAVENOUS at 07:25

## 2018-04-11 RX ADMIN — PROPOFOL 150 MG: 10 INJECTION, EMULSION INTRAVENOUS at 07:38

## 2018-04-11 RX ADMIN — FAMOTIDINE 20 MG: 20 TABLET, FILM COATED ORAL at 07:06

## 2018-04-11 RX ADMIN — ONDANSETRON 4 MG: 2 INJECTION INTRAMUSCULAR; INTRAVENOUS at 07:45

## 2018-04-11 RX ADMIN — FENTANYL CITRATE 50 MCG: 50 INJECTION, SOLUTION INTRAMUSCULAR; INTRAVENOUS at 07:30

## 2018-04-11 NOTE — ANESTHESIA PREPROCEDURE EVALUATION
Anesthetic History   No history of anesthetic complications            Review of Systems / Medical History  Patient summary reviewed and pertinent labs reviewed    Pulmonary  Within defined limits                 Neuro/Psych   Within defined limits           Cardiovascular  Within defined limits                Exercise tolerance: >4 METS     GI/Hepatic/Renal     GERD: poorly controlled           Endo/Other      Hypothyroidism: well controlled       Other Findings   Comments: Documentation of current medication  Current medications obtained, documented and obtained? YES      Risk Factors for Postoperative nausea/vomiting:       History of postoperative nausea/vomiting? NO       Female? YES       Motion sickness? NO       Intended opioid administration for postoperative analgesia? YES      Smoking Abstinence:  Current Smoker? NO  Elective Surgery? YES  Seen preoperatively by anesthesiologist or proxy prior to day of surgery? YES  Pt abstained from smoking 24 hours prior to anesthesia?  N/A    Preventive care/screening for High Blood Pressure:  Aged 18 years and older: YES  Screened for high blood pressure: YES  Patients with high blood pressure referred to primary care provider   for BP management: YES                 Physical Exam    Airway  Mallampati: I  TM Distance: 4 - 6 cm  Neck ROM: normal range of motion   Mouth opening: Normal     Cardiovascular  Regular rate and rhythm,  S1 and S2 normal,  no murmur, click, rub, or gallop  Rhythm: regular  Rate: normal         Dental  No notable dental hx       Pulmonary  Breath sounds clear to auscultation               Abdominal  GI exam deferred       Other Findings            Anesthetic Plan    ASA: 2  Anesthesia type: general          Induction: Intravenous  Anesthetic plan and risks discussed with: Patient

## 2018-04-11 NOTE — BRIEF OP NOTE
BRIEF OPERATIVE NOTE    Date of Procedure: 4/11/2018   Preoperative Diagnosis: HIP MASS R22.42  Postoperative Diagnosis: * No post-op diagnosis entered *    Procedure(s):  EXCISION OF LEFT HIP MASS  Surgeon(s) and Role:     * Javier Grullon MD - Primary         Surgical Staff:  Circ-1: Liv Winters RN  Scrub Tech-1: Nacho Hatfield  Surg Asst-1: Farideh Jackman  Event Time In   Incision Start 0744   Incision Close      Anesthesia: General   Estimated Blood Loss: Minimal  Specimens: * No specimens in log *   Findings: Mass   Complications: none  Implants: * No implants in log *

## 2018-04-11 NOTE — ANESTHESIA POSTPROCEDURE EVALUATION
Post-Anesthesia Evaluation & Assessment    Visit Vitals    /62    Pulse 67    Temp 36.4 °C (97.5 °F)    Resp 13    Ht 5' 1\" (1.549 m)    Wt 68.9 kg (152 lb)    SpO2 100%    BMI 28.72 kg/m2       Post-operative hydration adequate. Pain score (VAS): 0 Pain Scale 1: Visual (04/11/18 0831)  Pain Intensity 1: 0 (04/11/18 0831)   Managed. Mental status & Level of consciousness: returned to baseline    Neurological status: returned to baseline    Pulmonary status: airway patent, oxygen given as needed. Complications related to anesthesia: none    Patient has met all discharge requirements.     Additional comments:        Phylicia Edwards MD  April 11, 2018

## 2018-04-11 NOTE — IP AVS SNAPSHOT
[facility] 
 
 
 [address] 
[phone] Patient: Fred Lazar MRN: [id number] About your hospitalization You were admitted on:  April 11, 2018 You last received care in the:  [facility] PHASE 2 RECOVERY You were discharged on:  April 11, 2018 Why you were hospitalized Your primary diagnosis was:  Not on File Follow-up Information Follow up With Details Comments Contact Info Rafat Manuel, [address] [facility] [address] [place] [id number] 
[phone] Your Scheduled Appointments Monday April 23, 2018  9:30 AM EDT  
POST OP with Kori Galarza MD  
[address] [facility] [id number] [place] [id number]  
[phone] Discharge Orders None A check ivet indicates which time of day the medication should be taken. My Medications START taking these medications Instructions Each Dose to Equal  
 Morning Noon Evening Bedtime HYDROmorphone 2 mg tablet Commonly known as:  DILAUDID Your last dose was: Your next dose is: Take 1 Tab by mouth every four (4) hours as needed for Pain. Max Daily Amount: 12 mg.  
 2 mg CONTINUE taking these medications Instructions Each Dose to Equal  
 Morning Noon Evening Bedtime CO Q-10 50 mg capsule Generic drug:  co-enzyme Q-10 Your last dose was: Your next dose is: Take 50 mg by mouth daily. 50 mg  
    
   
   
   
  
 cyanocobalamin 1,000 mcg/mL injection Commonly known as:  VITAMIN B-12 Your last dose was: Your next dose is:    
   
   
 1 mL by IntraMUSCular route every thirty (30) days. 1000 mcg DAILY MULTI-VITAMINS/IRON tablet Generic drug:  multivitamin with iron Your last dose was: Your next dose is: Take 1 Tab by mouth daily. 1 Tab ELMIRON 100 mg capsule Generic drug:  pentosan polysulfate sodium Your last dose was: Your next dose is: TAKE ONE CAPSULE BY MOUTH BEFORE MEALS - BREAKFAST LUNCH AND DINNER  
     
   
   
   
  
 * ESTRACE 0.01 % (0.1 mg/gram) vaginal cream  
Generic drug:  estradiol Your last dose was: Your next dose is: Insert 2 g into vagina daily. 2 g  
    
   
   
   
  
 * estradiol 0.025 mg/24 hr Ptsw Commonly known as:  VIVELLE-DOT Your last dose was: Your next dose is:    
   
   
 Apply twice weekly  
     
   
   
   
  
 fluvoxaMINE 50 mg tablet Commonly known as:  Donnald Noel Your last dose was: Your next dose is: TAKE ONE TABLET BY MOUTH EVERY NIGHT AT BEDTIME ([address]) levothyroxine 75 mcg tablet Commonly known as:  SYNTHROID Your last dose was: Your next dose is: Take 1 Tab by mouth Daily (before breakfast). 75 mcg  
    
   
   
   
  
 progesterone 100 mg capsule Commonly known as:  PROMETRIUM Your last dose was: Your next dose is: TAKE ONE CAPSULE BY MOUTH DAILY FOR TWO WEEKS, THEN TAKE 2 WEEKS OFF  
     
   
   
   
  
 thyroid (Pork) 15 mg tablet Commonly known as:  ARMOUR THYROID Your last dose was: Your next dose is: Take 1 Tab by mouth two (2) times a day. 15 mg  
    
   
   
   
  
 * Notice: This list has 2 medication(s) that are the same as other medications prescribed for you. Read the directions carefully, and ask your doctor or other care provider to review them with you. Where to Get Your Medications Information on where to get these meds will be given to you by the nurse or doctor. ! Ask your nurse or doctor about these medications HYDROmorphone 2 mg tablet Opioid Education Prescription Opioids: What You Need to Know: 
 
Prescription opioids can be used to help relieve moderate-to-severe pain and are often prescribed following a surgery or injury, or for certain health conditions. These medications can be an important part of treatment but also come with serious risks. Opioids are strong pain medicines. Examples include hydrocodone, oxycodone, fentanyl, and morphine. Heroin is an example of an illegal opioid. It is important to work with your health care provider to make sure you are getting the safest, most effective care. WHAT ARE THE RISKS AND SIDE EFFECTS OF OPIOID USE? Prescription opioids carry serious risks of addiction and overdose, especially with prolonged use. An opioid overdose, often marked by slow breathing, can cause sudden death. The use of prescription opioids can have a number of side effects as well, even when taken as directed. · Tolerance-meaning you might need to take more of a medication for the same pain relief · Physical dependence-meaning you have symptoms of withdrawal when the medication is stopped. Withdrawal symptoms can include nausea, sweating, chills, diarrhea, stomach cramps, and muscle aches. Withdrawal can last up to several weeks, depending on which drug you took and how long you took it. · Increased sensitivity to pain · Constipation · Nausea, vomiting, and dry mouth · Sleepiness and dizziness · Confusion · Depression · Low levels of testosterone that can result in lower sex drive, energy, and strength · Itching and sweating RISKS ARE GREATER WITH:      
· History of drug misuse, substance use disorder, or overdose · Mental health conditions (such as depression or anxiety) · Sleep apnea · Older age (72 years or older) · Pregnancy Avoid alcohol while taking prescription opioids. Also, unless specifically advised by your health care provider, medications to avoid include: · Benzodiazepines (such as Xanax or Valium) · Muscle relaxants (such as Soma or Flexeril) · Hypnotics (such as Ambien or Lunesta) · Other prescription opioids KNOW YOUR OPTIONS Talk to your health care provider about ways to manage your pain that don't involve prescription opioids. Some of these options may actually work better and have fewer risks and side effects. Options may include: 
· Pain relievers such as acetaminophen, ibuprofen, and naproxen · Some medications that are also used for depression or seizures · Physical therapy and exercise · Counseling to help patients learn how to cope better with triggers of pain and stress. · Application of heat or cold compress · Massage therapy · Relaxation techniques Be Informed Make sure you know the name of your medication, how much and how often to take it, and its potential risks & side effects. IF YOU ARE PRESCRIBED OPIOIDS FOR PAIN: 
· Never take opioids in greater amounts or more often than prescribed. Remember the goal is not to be pain-free but to manage your pain at a tolerable level. · Follow up with your primary care provider to: · Work together to create a plan on how to manage your pain. · Talk about ways to help manage your pain that don't involve prescription opioids. · Talk about any and all concerns and side effects. · Help prevent misuse and abuse. · Never sell or share prescription opioids · Help prevent misuse and abuse. · Store prescription opioids in a secure place and out of reach of others (this may include visitors, children, friends, and family). · Safely dispose of unused/unwanted prescription opioids: Find your community drug take-back program or your pharmacy mail-back program, or flush them down the toilet, following guidance from the Food and Drug Administration (www.fda.gov/Drugs/ResourcesForYou). · Visit www.cdc.gov/drugoverdose to learn about the risks of opioid abuse and overdose. · If you believe you may be struggling with addiction, tell your health care provider and ask for guidance or call Baylee Ch at 1-090-657-ZSEL. Discharge Instructions Instructions Following Ambulatory Surgery Patient: Layton Gamboa MRN: [id number]  SSN: [id number] YOB: 1955  Age: 58 y.o. Sex: female Activity · As tolerated, walking encourage, stairs are okay · Avoid strenuous activities - no lifting anything heavier than 15 pounds. · You may shower at home after 48 hours. Diet · Regular diet after nausea from the anesthetic has passed. Patient armband removed and given to patient to take home. Patient was informed of the privacy risks if armband lost or stolen Pain · Take pain medication as directed by your doctor ·  Call your doctor if pain is NOT relieved by medication Dressing Care · Remove outer dressing (if any) after 48 hours. Leave steri-strips (if any) in place until they fall off. After Anesthesia · For the first 24 hours: DO NOT Drive, Drink alcoholic beverages, or Make important decisions · Be aware of dizziness following anesthesia and while taking pain medication Call your doctor if 
· Excessive bleeding that does not stop after holding mild pressure over the area · Temperature of 101 degrees F or above · Redness,excessive swelling or bruising, and/or green or yellow, smelly discharge from incision · If nausea and vomiting continues Follow-Up Phone Calls · Call the office at [phone] to make your follow-up appointment Appointment date/time: 2 weeks after the surgery. Dr. Ruth Soares cell phone number is [phone]. Please call me if you have any concerns or questions. DISCHARGE SUMMARY from Nurse PATIENT INSTRUCTIONS: 
 
After general anesthesia or intravenous sedation, for 24 hours or while taking prescription Narcotics: · Limit your activities · Do not drive and operate hazardous machinery · Do not make important personal or business decisions · Do  not drink alcoholic beverages · If you have not urinated within 8 hours after discharge, please contact your surgeon on call. Report the following to your surgeon: 
· Excessive pain, swelling, redness or odor of or around the surgical area · Temperature over 100.5 · Nausea and vomiting lasting longer than 4 hours or if unable to take medications · Any signs of decreased circulation or nerve impairment to extremity: change in color, persistent  numbness, tingling, coldness or increase pain · Any questions What to do at Home: No smoking/ No tobacco products/ Avoid exposure to second hand smoke Surgeon General's Warning:  Quitting smoking now greatly reduces serious risk to your health. Obesity, smoking, and sedentary lifestyle greatly increases your risk for illness A healthy diet, regular physical exercise & weight monitoring are important for maintaining a healthy lifestyle You may be retaining fluid if you have a history of heart failure or if you experience any of the following symptoms:  Weight gain of 3 pounds or more overnight or 5 pounds in a week, increased swelling in our hands or feet or shortness of breath while lying flat in bed. Please call your doctor as soon as you notice any of these symptoms; do not wait until your next office visit. Recognize signs and symptoms of STROKE: 
 
F-face looks uneven A-arms unable to move or move unevenly S-speech slurred or non-existent T-time-call 911 as soon as signs and symptoms begin-DO NOT go Back to bed or wait to see if you get better-TIME IS BRAIN. Warning Signs of HEART ATTACK Call 911 if you have these symptoms: 
? Chest discomfort.  Most heart attacks involve discomfort in the center of the chest that lasts more than a few minutes, or that goes away and comes back. It can feel like uncomfortable pressure, squeezing, fullness, or pain. ? Discomfort in other areas of the upper body. Symptoms can include pain or discomfort in one or both arms, the back, neck, jaw, or stomach. ? Shortness of breath with or without chest discomfort. ? Other signs may include breaking out in a cold sweat, nausea, or lightheadedness. Don't wait more than five minutes to call [address]! Fast action can save your life. Calling 911 is almost always the fastest way to get lifesaving treatment. Emergency Medical Services staff can begin treatment when they arrive  up to an hour sooner than if someone gets to the hospital by car. The discharge information has been reviewed with the patient. The patient verbalized understanding. Discharge medications reviewed with the patient and appropriate educational materials and side effects teaching were provided. Patient armband removed and given to patient to take home. Patient was informed of the privacy risks if armband lost or stolen Introducing [facility] SERVICES! Dear Gt Awad: Thank you for requesting a Eyeonplay account. Our records indicate that you already have an active Eyeonplay account. You can access your account anytime at [email or web address] Did you know that you can access your hospital and ER discharge instructions at any time in Eyeonplay? You can also review all of your test results from your hospital stay or ER visit. Additional Information If you have questions, please visit the Frequently Asked Questions section of the Eyeonplay website at [email or web address] Remember, Eyeonplay is NOT to be used for urgent needs. For medical emergencies, dial 911. Now available from your iPhone and Android! Introducing Tomas Garcia As a [facility] patient, I wanted to make you aware of our electronic visit tool called Tomas Garcia. Ezak[employer] 24/7 allows you to connect within minutes with a medical provider 24 hours a day, seven days a week via a mobile device or tablet or logging into a secure website from your computer. You can access Bellst[employer] from anywhere in the [place]. A virtual visit might be right for you when you have a simple condition and feel like you just dont want to get out of bed, or cant get away from work for an appointment, when your regular Thersia Marj provider is not available (evenings, weekends or holidays), or when youre out of town and need minor care. Electronic visits cost only $49 and if the Ezakus 24/7 provider determines a prescription is needed to treat your condition, one can be electronically transmitted to a nearby pharmacy*. Please take a moment to enroll today if you have not already done so. The enrollment process is free and takes just a few minutes. To enroll, please download the Teneros/7 alex to your tablet or phone, or visit [email or web address] to enroll on your computer. And, as an [address] [place] [address] patient with a [employer] account, the results of your visits will be scanned into your electronic medical record and your primary care provider will be able to view the scanned results. We urge you to continue to see your regular [employer] provider for your ongoing medical care. And while your primary care provider may not be the one available when you seek a Bellstrike virtual visit, the peace of mind you get from getting a real diagnosis real time can be priceless. For more information on Bellstrike, view our Frequently Asked Questions (FAQs) at [email or web address]. Sincerely, 
 
Margot Ferreira MD 
Chief Medical Officer Brian Carter *:  certain medications cannot be prescribed via Bellstrike Providers Seen During Your Hospitalization Provider Specialty Primary office phone Sendy Sandoval MD Surgery [phone] Your Primary Care Physician (PCP) Primary Care Physician Office Phone Office Fax Lissy Rueda [phone] You are allergic to the following Allergen Reactions Penicillins Anaphylaxis Codeine Rash Recent Documentation Height Weight BMI OB Status Smoking Status 1.549 m 68.9 kg 28.72 kg/m2 Postmenopausal Never Smoker Emergency Contacts Name Discharge Info Relation Home Work Mobile [address] [place] CAREGIVER [3] Spouse [3] [phone] Patient Belongings The following personal items are in your possession at time of discharge: 
  Dental Appliances: None  Visual Aid: With patient      Home Medications: None   Jewelry: None  Clothing: Pants, Shirt, Footwear, Undergarments    Other Valuables: None Please provide this summary of care documentation to your next provider. Signatures-by signing, you are acknowledging that this After Visit Summary has been reviewed with you and you have received a copy. Patient Signature:  ____________________________________________________________ Date:  ____________________________________________________________  
  
Norris Steward Provider Signature:  ____________________________________________________________ Date:  ____________________________________________________________

## 2018-04-11 NOTE — DISCHARGE INSTRUCTIONS
Instructions Following Ambulatory Surgery    Patient: Rhiannon Moreno MRN: [id number]  SSN: [id number]    YOB: 1955  Age: 58 y.o. Sex: female      Activity  · As tolerated, walking encourage, stairs are okay  · Avoid strenuous activities - no lifting anything heavier than 15 pounds. · You may shower at home after 48 hours. Diet  · Regular diet after nausea from the anesthetic has passed. Patient armband removed and given to patient to take home. Patient was informed of the privacy risks if armband lost or stolen    Pain  · Take pain medication as directed by your doctor  ·  Call your doctor if pain is NOT relieved by medication    Dressing Care  · Remove outer dressing (if any) after 48 hours. Leave steri-strips (if any) in place until they fall off. After Anesthesia  · For the first 24 hours: DO NOT Drive, Drink alcoholic beverages, or Make important decisions  · Be aware of dizziness following anesthesia and while taking pain medication    Call your doctor if  · Excessive bleeding that does not stop after holding mild pressure over the area  · Temperature of 101 degrees F or above  · Redness,excessive swelling or bruising, and/or green or yellow, smelly discharge from incision  · If nausea and vomiting continues    Follow-Up Phone Calls  · Call the office at [phone] to make your follow-up appointment    Appointment date/time: 2 weeks after the surgery. Dr. Ritesh Rashid cell phone number is [phone]. Please call me if you have any concerns or questions.    DISCHARGE SUMMARY from Nurse    PATIENT INSTRUCTIONS:    After general anesthesia or intravenous sedation, for 24 hours or while taking prescription Narcotics:  · Limit your activities  · Do not drive and operate hazardous machinery  · Do not make important personal or business decisions  · Do  not drink alcoholic beverages  · If you have not urinated within 8 hours after discharge, please contact your surgeon on call.    Report the following to your surgeon:  · Excessive pain, swelling, redness or odor of or around the surgical area  · Temperature over 100.5  · Nausea and vomiting lasting longer than 4 hours or if unable to take medications  · Any signs of decreased circulation or nerve impairment to extremity: change in color, persistent  numbness, tingling, coldness or increase pain  · Any questions    What to do at Home:  No smoking/ No tobacco products/ Avoid exposure to second hand smoke  Surgeon General's Warning:  Quitting smoking now greatly reduces serious risk to your health. Obesity, smoking, and sedentary lifestyle greatly increases your risk for illness    A healthy diet, regular physical exercise & weight monitoring are important for maintaining a healthy lifestyle    You may be retaining fluid if you have a history of heart failure or if you experience any of the following symptoms:  Weight gain of 3 pounds or more overnight or 5 pounds in a week, increased swelling in our hands or feet or shortness of breath while lying flat in bed. Please call your doctor as soon as you notice any of these symptoms; do not wait until your next office visit. Recognize signs and symptoms of STROKE:    F-face looks uneven    A-arms unable to move or move unevenly    S-speech slurred or non-existent    T-time-call 911 as soon as signs and symptoms begin-DO NOT go       Back to bed or wait to see if you get better-TIME IS BRAIN. Warning Signs of HEART ATTACK     Call 911 if you have these symptoms:   Chest discomfort. Most heart attacks involve discomfort in the center of the chest that lasts more than a few minutes, or that goes away and comes back. It can feel like uncomfortable pressure, squeezing, fullness, or pain.  Discomfort in other areas of the upper body. Symptoms can include pain or discomfort in one or both arms, the back, neck, jaw, or stomach.    Shortness of breath with or without chest discomfort.  Other signs may include breaking out in a cold sweat, nausea, or lightheadedness. Don't wait more than five minutes to call 911 - MINUTES MATTER! Fast action can save your life. Calling 911 is almost always the fastest way to get lifesaving treatment. Emergency Medical Services staff can begin treatment when they arrive -- up to an hour sooner than if someone gets to the hospital by car. The discharge information has been reviewed with the patient. The patient verbalized understanding. Discharge medications reviewed with the patient and appropriate educational materials and side effects teaching were provided. Patient armband removed and given to patient to take home.   Patient was informed of the privacy risks if armband lost or stolen

## 2018-04-11 NOTE — PROGRESS NOTES
Date of Surgery Update:  Tanisha Munoz was seen and examined. History and physical has been reviewed. The patient has been examined. There have been no significant clinical changes since the completion of the originally dated History and Physical. Will proceed with excision of left hip mass.      Signed By: Gonzalez Calderon MD     April 11, 2018 7:14 AM

## 2018-04-11 NOTE — H&P
History of Present Illness:       Elena Verma is a 58 y.o. female who presented with a left hip mass. The mass has been present for a couple years, but it has increased in size recently and it is causing pain. The pain is mainly with movement. She denies any fever or chills. She denies any drainage or skin changes.          Patient Active Problem List     Diagnosis Date Noted    Ponce's esophagus with dysplasia 2017    Menopausal symptom 10/24/2017    Bunion of great toe of right foot 10/24/2017    Interstitial cystitis 2017    Osteoporosis 2017    Acquired hypothyroidism 2017    History of anemia 2017    B12 deficiency 2017    History of positive PPD 2017    Tinnitus 2017           Past Medical History:   Diagnosis Date    Depression      Menopause      Osteoarthritis      Thyroid disease      Tinnitus              Past Surgical History:   Procedure Laterality Date    HX BACK SURGERY   2011     discectomy    HX BREAST BIOPSY Right 2013     Results were benign    HX  SECTION               Social History   Substance Use Topics    Smoking status: Never Smoker    Smokeless tobacco: Never Used    Alcohol use No          History   Smoking Status    Never Smoker   Smokeless Tobacco    Never Used             Family History   Problem Relation Age of Onset    Osteoporosis Mother      Heart Disease Mother      Hypertension Mother      Arthritis-osteo Mother      Asthma Father      Hypertension Father      Cancer Father 67       colon    Osteoporosis Sister      Thyroid Disease Sister      Arthritis-osteo Sister      Bipolar Disorder Brother      Cancer Maternal Grandmother         gastric    Cancer Paternal Grandmother         cervical           Current Outpatient Prescriptions   Medication Sig    citalopram (CELEXA) 20 mg tablet Take  by mouth daily.     fluvoxaMINE (LUVOX) 50 mg tablet TAKE ONE TABLET BY MOUTH EVERY NIGHT AT BEDTIME (GENERIC FOR LUVOX)    ELMIRON 100 mg capsule TAKE ONE CAPSULE BY MOUTH BEFORE MEALS - BREAKFAST LUNCH AND DINNER    progesterone (PROMETRIUM) 100 mg capsule TAKE ONE CAPSULE BY MOUTH DAILY FOR TWO WEEKS, THEN TAKE 2 WEEKS OFF    estradiol (VIVELLE-DOT) 0.025 mg/24 hr ptsw Apply twice weekly    cyanocobalamin (VITAMIN B-12) 1,000 mcg/mL injection 1 mL by IntraMUSCular route every thirty (30) days.  thyroid, Pork, (ARMOUR THYROID) 15 mg tablet Take 1 Tab by mouth two (2) times a day.  levothyroxine (SYNTHROID) 75 mcg tablet Take 1 Tab by mouth Daily (before breakfast).  estradiol (ESTRACE) 0.01 % (0.1 mg/gram) vaginal cream Insert 2 g into vagina daily.  co-enzyme Q-10 (CO Q-10) 50 mg capsule Take 50 mg by mouth daily.  prasterone, dhea,-calcium carb (DHEA) 10 mg-47 mg calcium tab Take  by mouth.  multivitamin with iron (DAILY MULTI-VITAMINS/IRON) tablet Take 1 Tab by mouth daily.      No current facility-administered medications for this visit.             Allergies   Allergen Reactions    Penicillins Anaphylaxis    Codeine Rash            Review of Systems:  Pertinent items are noted in the History of Present Illness.     Objective:           Visit Vitals    /57 (BP 1 Location: Right arm, BP Patient Position: Sitting)    Pulse 65    Temp 95.4 °F (35.2 °C) (Oral)    Resp 16    Ht 5' 1.5\" (1.562 m)    Wt 70.5 kg (155 lb 6.4 oz)    SpO2 99%    BMI 28.89 kg/m2         Physical Exam:       General:  in no apparent distress, alert and oriented times 3                                 Left hip: That is a 4 x 2 cm left hip mass localized on the lateral side, that is deep, slightly movable with no overlying skin changes consistent with a deep lipoma             Imaging and Lab Review:      CBC:         Lab Results   Component Value Date/Time     WBC 5.6 03/01/2018 01:47 PM     RBC 4.54 03/01/2018 01:47 PM     HGB 13.5 03/01/2018 01:47 PM     HCT 41.0 03/01/2018 01:47 PM     PLATELET 472 19/50/2418 01:47 PM      BMP:         Lab Results   Component Value Date/Time     Glucose 93 03/01/2018 01:47 PM     Sodium 142 03/01/2018 01:47 PM     Potassium 4.0 03/01/2018 01:47 PM     Chloride 106 03/01/2018 01:47 PM     CO2 28 03/01/2018 01:47 PM     BUN 9 03/01/2018 01:47 PM     Creatinine 0.76 03/01/2018 01:47 PM     Calcium 8.6 03/01/2018 01:47 PM      CMP:        Lab Results   Component Value Date/Time     Glucose 93 03/01/2018 01:47 PM     Sodium 142 03/01/2018 01:47 PM     Potassium 4.0 03/01/2018 01:47 PM     Chloride 106 03/01/2018 01:47 PM     CO2 28 03/01/2018 01:47 PM     BUN 9 03/01/2018 01:47 PM     Creatinine 0.76 03/01/2018 01:47 PM     Calcium 8.6 03/01/2018 01:47 PM     Anion gap 8 03/01/2018 01:47 PM     BUN/Creatinine ratio 12 03/01/2018 01:47 PM     Alk.  phosphatase 72 03/01/2018 01:47 PM     Protein, total 6.5 03/01/2018 01:47 PM     Albumin 4.0 03/01/2018 01:47 PM     Globulin 2.5 03/01/2018 01:47 PM     A-G Ratio 1.6 03/01/2018 01:47 PM          Recent Results    No results found for this or any previous visit (from the past 24 hour(s)).        images and reports reviewed     Assessment:   Manette Number is a 58 y.o. female is presenting with left hip mass consistent with a deep lipoma.      Plan:      Scheduled for excision of left hip mass

## 2018-04-11 NOTE — OP NOTES
[facility]  OPERATIVE REPORT    Laurita Galeana  MR#: [id number]  : 1955  ACCOUNT #: [de-identified]   DATE OF SERVICE: 2018    SURGEON:  Nessa Cabarl. Katerin Landry MD    PREOPERATIVE DIAGNOSIS:  Left hip mass. POSTOPERATIVE DIAGNOSIS:  Left hip mass. PROCEDURE:  Excision of left hip mass. Size is about 5 cm  (Reaches the fascia of the muscle)    ANESTHESIA:  General.    COMPLICATIONS:  None. SPECIMEN:  Left hip mass. BLOOD LOSS:  Minimal.    DETAILS OF PROCEDURE:  The patient was brought to operating room. Anesthesia was induced. Scrubbing and draping of the left hip was done in the usual manner after the patient was placed on the right lateral position with the left side up. A timeout was performed. A vertical skin incision over the mass was performed. The incision was about 4 cm long, deepened through the skin and subcutaneous tissue. Small flaps were raised on the skin on both sides. At this point, a mass was identified. It was dissected. It was about 5-6 cm in size and it reached all the way to the fascia of the thigh muscles. At this point, I had to take a small piece of the. This was taken out completely. Hemostasis was secured. At this point, the opening in the fascia was less than 0.5 cm, so I closed it with interrupted Vicryl suture and then the subcutaneous tissue was closed with a 3-0 Vicryl subcuticular sutures.       MD Robert Thornton / GERDA  D: 2018 08:20     T: 2018 11:51  JOB #: [id number]

## 2018-04-23 ENCOUNTER — OFFICE VISIT (OUTPATIENT)
Dept: SURGERY | Age: 63
End: 2018-04-23

## 2018-04-23 VITALS
OXYGEN SATURATION: 98 % | BODY MASS INDEX: 28.66 KG/M2 | HEIGHT: 61 IN | RESPIRATION RATE: 16 BRPM | WEIGHT: 151.8 LBS | DIASTOLIC BLOOD PRESSURE: 54 MMHG | HEART RATE: 69 BPM | TEMPERATURE: 96.1 F | SYSTOLIC BLOOD PRESSURE: 102 MMHG

## 2018-04-23 DIAGNOSIS — Z09 POSTOPERATIVE EXAMINATION: Primary | ICD-10-CM

## 2018-04-23 NOTE — PROGRESS NOTES
Reyna Coley is a 58 y.o. female who presents today for a post-op exam for an excision of a left hip mass, about 5 cm in size, performed on 04/11/18. Patient scores their post-op pain level on a pain scale from 1-10 as a 0 today. 1. Have you been to the ER, urgent care clinic since your last visit? Hospitalized since your last visit? No    2. Have you seen or consulted any other health care providers outside of the [address] [place] [address] since your last visit? Include any pap smears or colon screening.  No

## 2018-04-23 NOTE — MR AVS SNAPSHOT
Dwayne Drought 
 
 
 [address] [facility] [address] 
[phone] Patient: Quinten Farr MRN: [id number] LCQ:[birth year] Visit Information Date & Time Provider Department Dept. Phone Encounter #  
 4/23/2018 10:15 AM MD [facility] [phone] [id number] Upcoming Health Maintenance Date Due FOBT Q 1 YEAR AGE 50-75 3/12/2019 BREAST CANCER SCRN MAMMOGRAM 3/21/2019 PAP AKA CERVICAL CYTOLOGY 3/1/2021 DTaP/Tdap/Td series (2 - Td) 2/28/2027 Allergies as of 4/23/2018  Review Complete On: 4/23/2018 By: Jameson العراقي Severity Noted Reaction Type Reactions Penicillins High 02/28/2017    Anaphylaxis Codeine  02/28/2017    Rash Current Immunizations  Reviewed on 2/28/2017 Name Date Influenza Vaccine (Quad) PF 10/24/2017 11:26 AM  
 Tdap 2/28/2017  2:03 PM  
  
 Not reviewed this visit Vitals BP Pulse Temp Resp Height(growth percentile) Weight(growth percentile) 102/54 (BP 1 Location: Right arm, BP Patient Position: Sitting) 69 96.1 °F (35.6 °C) (Oral) 16 5' 1\" (1.549 m) 151 lb 12.8 oz (68.9 kg) SpO2 BMI OB Status Smoking Status 98% 28.68 kg/m2 Postmenopausal Never Smoker Vitals History BMI and BSA Data Body Mass Index Body Surface Area  
 28.68 kg/m 2 1.72 m 2 Preferred Pharmacy Pharmacy Name Phone [facility], [address] [facility] [phone] Your Updated Medication List  
  
   
This list is accurate as of 4/23/18 11:45 AM.  Always use your most recent med list.  
  
  
  
  
 CO Q-10 50 mg capsule Generic drug:  co-enzyme Q-10 Take 50 mg by mouth daily. cyanocobalamin 1,000 mcg/mL injection Commonly known as:  VITAMIN B-12  
1 mL by IntraMUSCular route every thirty (30) days. DAILY MULTI-VITAMINS/IRON tablet Generic drug:  multivitamin with iron Take 1 Tab by mouth daily. ELMIRON 100 mg capsule Generic drug:  pentosan polysulfate sodium TAKE ONE CAPSULE BY MOUTH BEFORE MEALS - BREAKFAST LUNCH AND DINNER  
  
 * ESTRACE 0.01 % (0.1 mg/gram) vaginal cream  
Generic drug:  estradiol Insert 2 g into vagina daily. * estradiol 0.025 mg/24 hr Ptsw Commonly known as:  VIVELLE-DOT Apply twice weekly  
  
 fluvoxaMINE 50 mg tablet Commonly known as:  Rondal Gasmen TAKE ONE TABLET BY MOUTH EVERY NIGHT AT BEDTIME ([address]) HYDROmorphone 2 mg tablet Commonly known as:  DILAUDID Take 1 Tab by mouth every four (4) hours as needed for Pain. Max Daily Amount: 12 mg.  
  
 levothyroxine 75 mcg tablet Commonly known as:  SYNTHROID Take 1 Tab by mouth Daily (before breakfast). progesterone 100 mg capsule Commonly known as:  PROMETRIUM  
TAKE ONE CAPSULE BY MOUTH DAILY FOR TWO WEEKS, THEN TAKE 2 WEEKS OFF  
  
 thyroid (Pork) 15 mg tablet Commonly known as:  ARMOUR THYROID Take 1 Tab by mouth two (2) times a day. * Notice: This list has 2 medication(s) that are the same as other medications prescribed for you. Read the directions carefully, and ask your doctor or other care provider to review them with you. Patient Instructions If you have any questions or concerns about today's appointment, the verbal and/or written instructions you were given for follow up care, please call our office at [phone]. [facility] - [address] 
 
[phone] office [phone] Introducing [facility]! Dear Redd Lee: Thank you for requesting a [employer] account. Our records indicate that you already have an active [employer] account. You can access your account anytime at [email or web address] Did you know that you can access your hospital and ER discharge instructions at any time in [employer]? You can also review all of your test results from your hospital stay or ER visit. Additional Information If you have questions, please visit the Frequently Asked Questions section of the [employer] website at [email or web address] Remember, [employer] is NOT to be used for urgent needs. For medical emergencies, dial 911. Now available from your iPhone and Android! Please provide this summary of care documentation to your next provider. Your primary care clinician is listed as Ana Cook. If you have any questions after today's visit, please call [phone].

## 2018-04-23 NOTE — LETTER
4/23/2018 11:19 AM 
 
Patient:  Tanisha Munoz YOB: 1955 Date of Visit: 4/23/2018 Dewey Calzada, [address] [place] [address] VIA In Basket Dear Dewey Calzada MD, Thank you for referring Ms. Gonzalez Severino to [place] [address] for evaluation and treatment. Below are the relevant portions of my assessment and plan of care. Thank you very much for your referral of Ms. Gonzalez Severino. If you have questions, please do not hesitate to call me. I look forward to following Ms. Llamas along with you and will keep you updated as to her progress. Sincerely, Gonzalez Calderon MD

## 2018-04-23 NOTE — PATIENT INSTRUCTIONS
If you have any questions or concerns about today's appointment, the verbal and/or written instructions you were given for follow up care, please call our office at [phone].     [facility] - [address]    [phone] office  [phone]

## 2018-04-25 NOTE — PROGRESS NOTES
Patient seen and examined. She is doing well. Her wound is healing well. Pathology showed lipoma. Follow up as needed.

## 2018-04-30 RX ORDER — PANTOPRAZOLE SODIUM 40 MG/1
40 TABLET, DELAYED RELEASE ORAL DAILY
Qty: 14 TAB | Refills: 0 | Status: SHIPPED | OUTPATIENT
Start: 2018-04-30 | End: 2018-05-16 | Stop reason: SDUPTHER

## 2018-05-16 RX ORDER — PANTOPRAZOLE SODIUM 40 MG/1
40 TABLET, DELAYED RELEASE ORAL DAILY
Qty: 14 TAB | Refills: 0 | Status: SHIPPED | OUTPATIENT
Start: 2018-05-16 | End: 2018-05-30

## 2018-06-18 RX ORDER — PROGESTERONE 100 MG/1
CAPSULE ORAL
Qty: 42 CAP | Refills: 1 | Status: SHIPPED | OUTPATIENT
Start: 2018-06-18 | End: 2020-08-18 | Stop reason: ALTCHOICE

## 2018-08-21 DIAGNOSIS — N95.1 MENOPAUSAL SYMPTOM: ICD-10-CM

## 2018-08-21 RX ORDER — ESTRADIOL 0.03 MG/D
FILM, EXTENDED RELEASE TRANSDERMAL
Qty: 24 PATCH | Refills: 1 | Status: SHIPPED | OUTPATIENT
Start: 2018-08-21 | End: 2019-04-22 | Stop reason: SDUPTHER

## 2018-08-24 ENCOUNTER — TELEPHONE (OUTPATIENT)
Dept: FAMILY MEDICINE CLINIC | Age: 63
End: 2018-08-24

## 2018-08-24 ENCOUNTER — HOSPITAL ENCOUNTER (OUTPATIENT)
Dept: LAB | Age: 63
Discharge: HOME | End: 2018-08-24
Payer: COMMERCIAL

## 2018-08-24 ENCOUNTER — OFFICE VISIT (OUTPATIENT)
Dept: FAMILY MEDICINE CLINIC | Age: 63
End: 2018-08-24

## 2018-08-24 VITALS
WEIGHT: 150 LBS | HEART RATE: 78 BPM | TEMPERATURE: 96.4 F | RESPIRATION RATE: 16 BRPM | SYSTOLIC BLOOD PRESSURE: 121 MMHG | OXYGEN SATURATION: 99 % | BODY MASS INDEX: 28.32 KG/M2 | DIASTOLIC BLOOD PRESSURE: 71 MMHG | HEIGHT: 61 IN

## 2018-08-24 DIAGNOSIS — R30.0 DYSURIA: Primary | ICD-10-CM

## 2018-08-24 DIAGNOSIS — R30.0 DYSURIA: ICD-10-CM

## 2018-08-24 LAB
BILIRUB UR QL STRIP: NEGATIVE
GLUCOSE UR-MCNC: NEGATIVE MG/DL
KETONES P FAST UR STRIP-MCNC: NEGATIVE MG/DL
PH UR STRIP: 5.5 [PH] (ref 4.6–8)
PROT UR QL STRIP: NEGATIVE
SP GR UR STRIP: 1.01 (ref 1–1.03)
UA UROBILINOGEN AMB POC: NORMAL (ref 0.2–1)
URINALYSIS CLARITY POC: CLEAR
URINALYSIS COLOR POC: YELLOW
URINE BLOOD POC: NEGATIVE
URINE LEUKOCYTES POC: NEGATIVE
URINE NITRITES POC: NEGATIVE

## 2018-08-24 PROCEDURE — 87086 URINE CULTURE/COLONY COUNT: CPT | Performed by: INTERNAL MEDICINE

## 2018-08-24 RX ORDER — NITROFURANTOIN 25; 75 MG/1; MG/1
100 CAPSULE ORAL 2 TIMES DAILY
Qty: 10 CAP | Refills: 0 | Status: SHIPPED | OUTPATIENT
Start: 2018-08-24 | End: 2018-10-01

## 2018-08-24 NOTE — TELEPHONE ENCOUNTER
Rafa Lerma is on backorder. The pharmacist called and was instructed per Dr Kori Angelo' verbal order to change prescription to Bactrim instead.

## 2018-08-24 NOTE — PROGRESS NOTES
Alfa Stevens is a 61 y.o. female (: 1955) presenting to address:    Chief Complaint   Patient presents with    Urinary Burning    Urinary Frequency       Vitals:    18 1437   BP: 121/71   Pulse: 78   Resp: 16   Temp: 96.4 °F (35.8 °C)   TempSrc: Oral   SpO2: 99%   Weight: 150 lb (68 kg)   Height: 5' 1\" (1.549 m)   PainSc:   0 - No pain       Hearing/Vision:   No exam data present    Learning Assessment:     Learning Assessment 2017   PRIMARY LEARNER Patient   HIGHEST LEVEL OF EDUCATION - PRIMARY LEARNER  4 YEARS OF COLLEGE   PRIMARY LANGUAGE ENGLISH   LEARNER PREFERENCE PRIMARY DEMONSTRATION     PICTURES   ANSWERED BY Self   RELATIONSHIP SELF     Depression Screening:     PHQ over the last two weeks 2018   PHQ Not Done -   Little interest or pleasure in doing things Not at all   Feeling down, depressed, irritable, or hopeless Not at all   Total Score PHQ 2 0     Fall Risk Assessment:     Fall Risk Assessment, last 12 mths 10/24/2017   Able to walk? Yes   Fall in past 12 months? No     Abuse Screening:     Abuse Screening Questionnaire 10/24/2017   Do you ever feel afraid of your partner? N   Are you in a relationship with someone who physically or mentally threatens you? N   Is it safe for you to go home? Y     Coordination of Care Questionaire:   1. Have you been to the ER, urgent care clinic since your last visit? Hospitalized since your last visit? NO    2. Have you seen or consulted any other health care providers outside of the [facility] since your last visit? Include any pap smears or colon screening. NO    Advanced Directive:   1. Do you have an Advanced Directive? YES    2. Would you like information on Advanced Directives?  NO

## 2018-08-24 NOTE — PROGRESS NOTES
HISTORY OF PRESENT ILLNESS  Kizzy Connor is a 61 y.o. female. HPI  C/o urinary frequency/urgency and dysuria, started a week ago, not any better, no flank pain, no fever  Pt has interstitial cystitis but \"these symptoms are very unusual for me\"  Review of Systems   Constitutional: Negative for chills and fever. Gastrointestinal: Negative for nausea. Genitourinary: Positive for frequency. Negative for flank pain and hematuria. Physical Exam   Abdominal: Soft. There is tenderness in the suprapubic area. There is no CVA tenderness. Vitals reviewed. ASSESSMENT and PLAN  Diagnoses and all orders for this visit:    1. Dysuria, ? UTI  -     AMB POC URINALYSIS DIP STICK AUTO W/O MICRO  -     nitrofurantoin, macrocrystal-monohydrate, (MACROBID) 100 mg capsule; Take 1 Cap by mouth two (2) times a day. -     CULTURE, URINE;  Future    Results for orders placed or performed in visit on 08/24/18   AMB POC URINALYSIS DIP STICK AUTO W/O MICRO   Result Value Ref Range    Color (UA POC) Yellow     Clarity (UA POC) Clear     Glucose (UA POC) Negative Negative    Bilirubin (UA POC) Negative Negative    Ketones (UA POC) Negative Negative    Specific gravity (UA POC) 1.010 1.001 - 1.035    Blood (UA POC) Negative Negative    pH (UA POC) 5.5 4.6 - 8.0    Protein (UA POC) Negative Negative    Urobilinogen (UA POC) 0.2 mg/dL 0.2 - 1    Nitrites (UA POC) Negative Negative    Leukocyte esterase (UA POC) Negative Negative     Follow up if not better

## 2018-08-26 LAB
BACTERIA SPEC CULT: ABNORMAL
BACTERIA SPEC CULT: ABNORMAL
SERVICE CMNT-IMP: ABNORMAL

## 2018-09-13 RX ORDER — PHENAZOPYRIDINE HYDROCHLORIDE 200 MG/1
200 TABLET, FILM COATED ORAL
Qty: 10 TAB | Refills: 0 | Status: SHIPPED | OUTPATIENT
Start: 2018-09-13 | End: 2018-09-16

## 2018-10-24 ENCOUNTER — HOSPITAL ENCOUNTER (OUTPATIENT)
Dept: CT IMAGING | Age: 63
Discharge: HOME | End: 2018-10-24
Attending: UROLOGY
Payer: COMMERCIAL

## 2018-10-24 DIAGNOSIS — N30.10 INTERSTITIAL CYSTITIS: ICD-10-CM

## 2018-10-24 DIAGNOSIS — R31.0 GROSS HEMATURIA: ICD-10-CM

## 2018-10-24 LAB — CREAT UR-MCNC: 0.7 MG/DL (ref 0.6–1.3)

## 2018-10-24 PROCEDURE — 74011636320 HC RX REV CODE- 636/320: Performed by: UROLOGY

## 2018-10-24 PROCEDURE — 82565 ASSAY OF CREATININE: CPT

## 2018-10-24 PROCEDURE — 74178 CT ABD&PLV WO CNTR FLWD CNTR: CPT

## 2018-10-24 RX ADMIN — IOPAMIDOL 120 ML: 612 INJECTION, SOLUTION INTRAVENOUS at 08:57

## 2018-10-25 NOTE — PROGRESS NOTES
Please inform of normal CT, and we will have more info at her cysto appointment.   Thanks  Juwan Valencia

## 2019-01-23 RX ORDER — PENTOSAN POLYSULFATE SODIUM 100 MG/1
CAPSULE, GELATIN COATED ORAL
Qty: 90 CAP | Refills: 0 | Status: SHIPPED | OUTPATIENT
Start: 2019-01-23 | End: 2019-04-12 | Stop reason: SDUPTHER

## 2019-03-18 ENCOUNTER — OFFICE VISIT (OUTPATIENT)
Dept: FAMILY MEDICINE CLINIC | Age: 64
End: 2019-03-18

## 2019-03-18 ENCOUNTER — HOSPITAL ENCOUNTER (OUTPATIENT)
Dept: LAB | Age: 64
Discharge: HOME | End: 2019-03-18
Payer: COMMERCIAL

## 2019-03-18 VITALS
BODY MASS INDEX: 27.3 KG/M2 | HEIGHT: 61 IN | OXYGEN SATURATION: 97 % | WEIGHT: 144.6 LBS | RESPIRATION RATE: 18 BRPM | TEMPERATURE: 97.9 F | DIASTOLIC BLOOD PRESSURE: 78 MMHG | SYSTOLIC BLOOD PRESSURE: 110 MMHG | HEART RATE: 71 BPM

## 2019-03-18 DIAGNOSIS — Z12.39 SCREENING FOR BREAST CANCER: ICD-10-CM

## 2019-03-18 DIAGNOSIS — Z12.11 SCREEN FOR COLON CANCER: ICD-10-CM

## 2019-03-18 DIAGNOSIS — E53.8 B12 DEFICIENCY: ICD-10-CM

## 2019-03-18 DIAGNOSIS — Z00.00 ROUTINE GENERAL MEDICAL EXAMINATION AT A HEALTH CARE FACILITY: Primary | ICD-10-CM

## 2019-03-18 DIAGNOSIS — Z00.00 ROUTINE GENERAL MEDICAL EXAMINATION AT A HEALTH CARE FACILITY: ICD-10-CM

## 2019-03-18 DIAGNOSIS — Z86.2 HISTORY OF ANEMIA: ICD-10-CM

## 2019-03-18 DIAGNOSIS — E03.9 ACQUIRED HYPOTHYROIDISM: ICD-10-CM

## 2019-03-18 LAB
ERYTHROCYTE [DISTWIDTH] IN BLOOD BY AUTOMATED COUNT: 12.9 % (ref 11.6–14.5)
HCT VFR BLD AUTO: 42.9 % (ref 35–45)
HGB BLD-MCNC: 14 G/DL (ref 12–16)
MCH RBC QN AUTO: 29.2 PG (ref 24–34)
MCHC RBC AUTO-ENTMCNC: 32.6 G/DL (ref 31–37)
MCV RBC AUTO: 89.4 FL (ref 74–97)
PLATELET # BLD AUTO: 304 K/UL (ref 135–420)
PMV BLD AUTO: 11.2 FL (ref 9.2–11.8)
RBC # BLD AUTO: 4.8 M/UL (ref 4.2–5.3)
WBC # BLD AUTO: 5.1 K/UL (ref 4.6–13.2)

## 2019-03-18 PROCEDURE — 36415 COLL VENOUS BLD VENIPUNCTURE: CPT

## 2019-03-18 PROCEDURE — 84443 ASSAY THYROID STIM HORMONE: CPT

## 2019-03-18 PROCEDURE — 85027 COMPLETE CBC AUTOMATED: CPT

## 2019-03-18 PROCEDURE — 82607 VITAMIN B-12: CPT

## 2019-03-18 PROCEDURE — 80053 COMPREHEN METABOLIC PANEL: CPT

## 2019-03-18 PROCEDURE — 84481 FREE ASSAY (FT-3): CPT

## 2019-03-18 PROCEDURE — 80061 LIPID PANEL: CPT

## 2019-03-18 PROCEDURE — 84439 ASSAY OF FREE THYROXINE: CPT

## 2019-03-18 NOTE — PROGRESS NOTES
Golden George is a 61 y.o. female (: 1955) presenting to address:    Chief Complaint   Patient presents with    Complete Physical       Vitals:    19 1107   BP: 110/78   Pulse: 71   Resp: 18   Temp: 97.9 °F (36.6 °C)   TempSrc: Oral   SpO2: 97%   Weight: 144 lb 9.6 oz (65.6 kg)   Height: 5' 1\" (1.549 m)   PainSc:   0 - No pain       Hearing/Vision:      Visual Acuity Screening    Right eye Left eye Both eyes   Without correction:      With correction: 20/40 20/20 20/20       Learning Assessment:     Learning Assessment 2017   PRIMARY LEARNER Patient   HIGHEST LEVEL OF EDUCATION - PRIMARY LEARNER  4 YEARS OF COLLEGE   PRIMARY LANGUAGE ENGLISH   LEARNER PREFERENCE PRIMARY DEMONSTRATION     PICTURES   ANSWERED BY Self   RELATIONSHIP SELF     Depression Screening:     3 most recent PHQ Screens 3/18/2019   PHQ Not Done -   Little interest or pleasure in doing things Not at all   Feeling down, depressed, irritable, or hopeless Not at all   Total Score PHQ 2 0     Fall Risk Assessment:     Fall Risk Assessment, last 12 mths 3/18/2019   Able to walk? Yes   Fall in past 12 months? No     Abuse Screening:     Abuse Screening Questionnaire 3/18/2019   Do you ever feel afraid of your partner? N   Are you in a relationship with someone who physically or mentally threatens you? N   Is it safe for you to go home? Y     Coordination of Care Questionaire:   1. Have you been to the ER, urgent care clinic since your last visit? Hospitalized since your last visit? NO    2. Have you seen or consulted any other health care providers outside of the [address] [place] [address] since your last visit? Include any pap smears or colon screening. YES urology    Advanced Directive:   1. Do you have an Advanced Directive? YES    2. Would you like information on Advanced Directives?  NO

## 2019-03-18 NOTE — PROGRESS NOTES
Assessment/Plan:    1. Routine general medical examination at a health care facility  - METABOLIC PANEL, COMPREHENSIVE; Future  - LIPID PANEL; Future    2. Acquired hypothyroidism  -had change in diet and now with supratherapeutic sx. Ck labs  - TSH 3RD GENERATION; Future  - T4, FREE; Future  - T3, FREE; Future    3. History of anemia  - CBC W/O DIFF; Future  - VITAMIN B12; Future    4. B12 deficiency  - VITAMIN B12; Future    5. Screen for colon cancer  - COLOGUARD TEST (FECAL DNA COLORECTAL CANCER SCREENING)    6. Screening for breast cancer  - [facility] MAMMO BI SCREENING INCL CAD; Future      The plan was discussed with the patient. The patient verbalized understanding and is in agreement with the plan. All medication potential side effects were discussed with the patient. Health Maintenance:   Health Maintenance   Topic Date Due    Shingrix Vaccine Age 49> (1 of 2) 08/23/2005    Influenza Age 5 to Adult  08/01/2018    FOBT Q 1 YEAR AGE 50-75  03/12/2019    BREAST CANCER SCRN MAMMOGRAM  03/21/2019    PAP AKA CERVICAL CYTOLOGY  03/01/2021    DTaP/Tdap/Td series (2 - Td) 02/28/2027    Bone Densitometry (Dexa) Screening  Completed    Hepatitis C Screening  Addressed       Prince Barber is a 61 y.o. female and presents with Complete Physical     Subjective:  Here for physical.     Pt c/o palpitations, more so with her change in diet. Previously dx with PAC/PVCs. Went to plant based diet a few months ago. Wonders if her thyroid level is off b/c she feels she's getting too much thyroid. Has cut back on coffee w/o improvement. ROS:  Constitutional: No recent weight change. No weakness/fatigue. No f/c. Skin: No rashes, change in nails/hair, itching   HENT: No HA, dizziness. No hearing loss/tinnitus. No nasal congestion/discharge. Eyes: No change in vision, double/blurred vision or eye pain/redness. Cardiovascular: No CP/+palpitations. No ORELLANA/orthopnea/PND.    Respiratory: No cough/sputum, dyspnea, wheezing. Gastointestinal: No dysphagia, reflux. No n/v. No constipation/diarrhea. No melena/rectal bleeding. Genitourinary: No dysuria, urinary hesitancy, nocturia, hematuria. No incontinence. Musculoskeletal: No joint pain/stiffness. No muscle pain/tenderness. Endo: No heat/cold intolerance, no polyuria/polydypsia. Heme: + h/o anemia. No easy bleeding/bruising. Allergy/Immunology: No seasonal rhinitis. Denies frequent colds, sinus/ear infections. Neurological: No seizures/numbness/weakness. No paresthesias. Psychiatric:  No depression, anxiety. The problem list was updated as a part of today's visit. Patient Active Problem List   Diagnosis Code    Osteoporosis M81.0    Acquired hypothyroidism E03.9    History of anemia Z86.2    B12 deficiency E53.8    History of positive PPD R76.11    Tinnitus H93.19    Interstitial cystitis N30.10    Menopausal symptom N95.1    Bunion of great toe of right foot M21.611    Ponce's esophagus with dysplasia K22.719    Adverse effect of anesthesia T41.45XA       The PSH, FH were reviewed. SH:  Social History     Tobacco Use    Smoking status: Never Smoker    Smokeless tobacco: Never Used   Substance Use Topics    Alcohol use: No    Drug use: No       Medications/Allergies:  Current Outpatient Medications on File Prior to Visit   Medication Sig Dispense Refill    ELMIRON 100 mg capsule TAKE ONE CAPSULE BY MOUTH DAILY BEFORE MEALS (BREAKFAST LUNCH AND DINNER) 90 Cap 0    ibuprofen (MOTRIN) 200 mg tablet Take  by mouth.       estradiol 0.025 mg/24 hr ptsw APPLY ONE PATCH TWICE WEEKLY 24 Patch 1    fluvoxaMINE (LUVOX) 50 mg tablet TAKE ONE TABLET BY MOUTH EVERY NIGHT AT BEDTIME 90 Tab 2    progesterone (PROMETRIUM) 100 mg capsule TAKE ONE CAPSULE BY MOUTH ONCE DAILY FOR TWO WEEKS, THEN TAKE TWO WEEKS OFF 42 Cap 1    cyanocobalamin (VITAMIN B12) 1,000 mcg/mL injection INJECT 1 ML INTRAMUSCULARY EVERY 30 DAYS 1 mL 11    thyroid, Pork, (ARMOUR THYROID) 15 mg tablet Take 1 Tab by mouth two (2) times a day. 180 Tab 3    levothyroxine (SYNTHROID) 75 mcg tablet Take 1 Tab by mouth Daily (before breakfast). 90 Tab 3    estradiol (ESTRACE) 0.01 % (0.1 mg/gram) vaginal cream Insert 2 g into vagina daily.  co-enzyme Q-10 (CO Q-10) 50 mg capsule Take 50 mg by mouth daily.  HYDROmorphone (DILAUDID) 2 mg tablet Take 1 Tab by mouth every four (4) hours as needed for Pain. Max Daily Amount: 12 mg. 24 Tab 0     No current facility-administered medications on file prior to visit. Allergies   Allergen Reactions    Penicillins Anaphylaxis    Codeine Rash       Objective:  Visit Vitals  /78 (BP 1 Location: Right arm, BP Patient Position: Sitting)   Pulse 71   Temp 97.9 °F (36.6 °C) (Oral)   Resp 18   Ht 5' 1\" (1.549 m)   Wt 144 lb 9.6 oz (65.6 kg)   SpO2 97%   BMI 27.32 kg/m²      Constitutional: Well developed, nourished, no distress, alert   HENT: Exterior ears and tympanic membranes normal bilaterally. Supple neck. No thyromegaly or lymphadenopathy. Oropharynx clear and moist mucous membranes. Normal inferior turbinates. Septum midline. Eyes: Conjunctiva normal. PERRL. Eyelids normal.   CV: S1, S2.  RRR. No murmurs/rubs. No thrills palpated. No carotid bruits. Intact distal pulses. No edema. No aortic bruits. Pulm: No abnormalities on inspection. Clear to auscultation bilaterally. No wheezing/rhonchi. Normal effort. GI: Soft, nontender, nondistended. Normal active bowel sounds. MS: Gait normal.  Joints without deformity/tenderness. Strength intact bilateral upper and lower ext. Normal ROM all extremities. Neuro: A/O x 3. No focal motor or sensory deficits. Speech normal.   Skin: No lesions/rashes on inspection. Psych: Appropriate affect, judgement and insight. Short-term memory intact.

## 2019-03-19 LAB
ALBUMIN SERPL-MCNC: 4.1 G/DL (ref 3.4–5)
ALBUMIN/GLOB SERPL: 1.4 {RATIO} (ref 0.8–1.7)
ALP SERPL-CCNC: 81 U/L (ref 45–117)
ALT SERPL-CCNC: 30 U/L (ref 13–56)
ANION GAP SERPL CALC-SCNC: 5 MMOL/L (ref 3–18)
AST SERPL-CCNC: 19 U/L (ref 15–37)
BILIRUB SERPL-MCNC: 0.4 MG/DL (ref 0.2–1)
BUN SERPL-MCNC: 10 MG/DL (ref 7–18)
BUN/CREAT SERPL: 17 (ref 12–20)
CALCIUM SERPL-MCNC: 8.7 MG/DL (ref 8.5–10.1)
CHLORIDE SERPL-SCNC: 105 MMOL/L (ref 100–108)
CHOLEST SERPL-MCNC: 180 MG/DL
CO2 SERPL-SCNC: 29 MMOL/L (ref 21–32)
CREAT SERPL-MCNC: 0.59 MG/DL (ref 0.6–1.3)
GLOBULIN SER CALC-MCNC: 2.9 G/DL (ref 2–4)
GLUCOSE SERPL-MCNC: 89 MG/DL (ref 74–99)
HDLC SERPL-MCNC: 48 MG/DL (ref 40–60)
HDLC SERPL: 3.8 {RATIO} (ref 0–5)
LDLC SERPL CALC-MCNC: 100 MG/DL (ref 0–100)
LIPID PROFILE,FLP: ABNORMAL
POTASSIUM SERPL-SCNC: 4.5 MMOL/L (ref 3.5–5.5)
PROT SERPL-MCNC: 7 G/DL (ref 6.4–8.2)
SODIUM SERPL-SCNC: 139 MMOL/L (ref 136–145)
T3FREE SERPL-MCNC: 3.2 PG/ML (ref 2.18–3.98)
T4 FREE SERPL-MCNC: 1.1 NG/DL (ref 0.7–1.5)
TRIGL SERPL-MCNC: 160 MG/DL (ref ?–150)
TSH SERPL DL<=0.05 MIU/L-ACNC: 0.62 UIU/ML (ref 0.36–3.74)
VIT B12 SERPL-MCNC: 304 PG/ML (ref 211–911)
VLDLC SERPL CALC-MCNC: 32 MG/DL

## 2019-03-27 DIAGNOSIS — Z12.11 SCREEN FOR COLON CANCER: Primary | ICD-10-CM

## 2019-03-27 RX ORDER — LEVOTHYROXINE SODIUM 75 UG/1
TABLET ORAL
Qty: 90 TAB | Refills: 2 | Status: SHIPPED | OUTPATIENT
Start: 2019-03-27 | End: 2019-09-23 | Stop reason: SDUPTHER

## 2019-03-28 RX ORDER — FLUVOXAMINE MALEATE 50 MG/1
TABLET ORAL
Qty: 90 TAB | Refills: 1 | Status: SHIPPED | OUTPATIENT
Start: 2019-03-28 | End: 2019-09-23 | Stop reason: SDUPTHER

## 2019-04-12 RX ORDER — PENTOSAN POLYSULFATE SODIUM 100 MG/1
CAPSULE, GELATIN COATED ORAL
Qty: 90 CAP | Refills: 0 | Status: SHIPPED | OUTPATIENT
Start: 2019-04-12 | End: 2019-07-12 | Stop reason: SDUPTHER

## 2019-04-22 DIAGNOSIS — N95.1 MENOPAUSAL SYMPTOM: ICD-10-CM

## 2019-04-22 RX ORDER — ESTRADIOL 0.03 MG/D
FILM, EXTENDED RELEASE TRANSDERMAL
Qty: 24 PATCH | Refills: 3 | Status: SHIPPED | OUTPATIENT
Start: 2019-04-22 | End: 2019-09-26 | Stop reason: SDUPTHER

## 2019-04-23 ENCOUNTER — HOSPITAL ENCOUNTER (OUTPATIENT)
Dept: MAMMOGRAPHY | Age: 64
Discharge: HOME | End: 2019-04-23
Attending: INTERNAL MEDICINE
Payer: COMMERCIAL

## 2019-04-23 DIAGNOSIS — Z12.31 VISIT FOR SCREENING MAMMOGRAM: ICD-10-CM

## 2019-04-23 PROCEDURE — 77063 BREAST TOMOSYNTHESIS BI: CPT

## 2019-05-17 RX ORDER — THYROID, PORCINE 15 MG/1
TABLET ORAL
Qty: 180 TAB | Refills: 2 | Status: SHIPPED | OUTPATIENT
Start: 2019-05-17 | End: 2019-12-20

## 2019-07-01 RX ORDER — CYANOCOBALAMIN 1000 UG/ML
INJECTION, SOLUTION INTRAMUSCULAR; SUBCUTANEOUS
Qty: 3 ML | Refills: 3 | Status: SHIPPED | OUTPATIENT
Start: 2019-07-01 | End: 2020-07-10

## 2019-09-04 ENCOUNTER — OFFICE VISIT (OUTPATIENT)
Dept: FAMILY MEDICINE CLINIC | Age: 64
End: 2019-09-04

## 2019-09-04 VITALS
SYSTOLIC BLOOD PRESSURE: 115 MMHG | WEIGHT: 148.4 LBS | DIASTOLIC BLOOD PRESSURE: 67 MMHG | TEMPERATURE: 97.1 F | HEART RATE: 66 BPM | RESPIRATION RATE: 18 BRPM | HEIGHT: 61 IN | OXYGEN SATURATION: 97 % | BODY MASS INDEX: 28.02 KG/M2

## 2019-09-04 DIAGNOSIS — Z01.810 PREOP CARDIOVASCULAR EXAM: ICD-10-CM

## 2019-09-04 DIAGNOSIS — H25.9 AGE-RELATED CATARACT OF BOTH EYES, UNSPECIFIED AGE-RELATED CATARACT TYPE: Primary | ICD-10-CM

## 2019-09-04 DIAGNOSIS — Z12.11 SCREEN FOR COLON CANCER: ICD-10-CM

## 2019-09-04 DIAGNOSIS — Z23 ENCOUNTER FOR IMMUNIZATION: ICD-10-CM

## 2019-09-04 NOTE — PROGRESS NOTES
Holli Cornell is a 59 y.o. female and presents for pre-operative evaluation. Subjective: This patient was seen at the request of Dr. Darien Vazquez for pre-operative evaluation for planned procedure: cataract surgery bilateral on 9/10/19 and 19 under [address] [place] anesthesia. She also has R bunion surgery planned for 2019 with Dr. Darío Schulz. Cardiac factors include:  none    METs: 5    ROS:  Constitutional: No recent weight change. No weakness/fatigue. No f/c. Skin: No rashes, change in nails/hair, itching   HENT: No HA, dizziness. No hearing loss/tinnitus. No nasal congestion/discharge. Eyes: No change in vision, double/blurred vision or eye pain/redness. Cardiovascular: No CP/palpitations. No ORELLANA/orthopnea/PND. Respiratory: No cough/sputum, dyspnea, wheezing. Gastointestinal: No dysphagia, reflux. No n/v. No constipation/diarrhea. No melena/rectal bleeding. Genitourinary: No dysuria, urinary hesitancy, nocturia, hematuria. No incontinence. Musculoskeletal: No joint pain/stiffness. No muscle pain/tenderness. Heme: No h/o anemia. No easy bleeding/bruising. Neurological: No seizures/numbness/weakness. No paresthesias. PMH:  Patient Active Problem List   Diagnosis Code    Osteoporosis M81.0    Acquired hypothyroidism E03.9    History of anemia Z86.2    B12 deficiency E53.8    History of positive PPD R76.11    Tinnitus H93.19    Interstitial cystitis N30.10    Menopausal symptom N95.1    Bunion of great toe of right foot M21.611    Ponce's esophagus with dysplasia K22.719    Adverse effect of anesthesia T41.45XA       PSH:  Past Surgical History:   Procedure Laterality Date    ABDOMEN SURGERY PROC UNLISTED      EXPLORATORY    HX APPENDECTOMY      HX BACK SURGERY  2011    discectomy    HX BREAST BIOPSY Right     Results were benign    HX  SECTION      HX COLONOSCOPY      TN EXCISION BENIGN LESION COMPLICATED      Excision of left hip mass.  Size is about 5 cm          SH:  Social History     Tobacco Use    Smoking status: Never Smoker    Smokeless tobacco: Never Used   Substance Use Topics    Alcohol use: No    Drug use: No       FH:  Family History   Problem Relation Age of Onset    Osteoporosis Mother     Heart Disease Mother     Hypertension Mother     Arthritis-osteo Mother     Asthma Father     Hypertension Father     Cancer Father 67        colon    Osteoporosis Sister     Thyroid Disease Sister     Arthritis-osteo Sister     Bipolar Disorder Brother     Cancer Maternal Grandmother         gastric    Cancer Paternal Grandmother         cervical       Medications/Allergies:  Current Outpatient Medications on File Prior to Visit   Medication Sig Dispense Refill    ELMIRON 100 mg capsule TAKE ONE CAPSULE BY MOUTH ONE TIME A DAY BEFORE MEALS ( BREAKFAST, LUNCH AND DINNER) 90 Cap 3    cyanocobalamin (VITAMIN B12) 1,000 mcg/mL injection INJECT 1 ML INTRAMUSCULARLY EVERY 30 DAYS 3 mL 3    ARMOUR THYROID 15 mg tablet TAKE ONE TABLET BY MOUTH TWICE A  Tab 2    estradiol 0.025 mg/24 hr ptsw APPLY ONE PATCH TWICE WEEKLY 24 Patch 3    fluvoxaMINE (LUVOX) 50 mg tablet TAKE ONE TABLET BY MOUTH EVERY NIGHT AT BEDTIME 90 Tab 1    levothyroxine (SYNTHROID) 75 mcg tablet TAKE ONE TABLET BY MOUTH ONCE DAILY BEFORE BREAKFAST 90 Tab 2    ibuprofen (MOTRIN) 200 mg tablet Take  by mouth.  progesterone (PROMETRIUM) 100 mg capsule TAKE ONE CAPSULE BY MOUTH ONCE DAILY FOR TWO WEEKS, THEN TAKE TWO WEEKS OFF 42 Cap 1    co-enzyme Q-10 (CO Q-10) 50 mg capsule Take 50 mg by mouth daily. No current facility-administered medications on file prior to visit.        Allergies   Allergen Reactions    Penicillins Anaphylaxis    Codeine Rash       Objective:  Visit Vitals  /67 (BP 1 Location: Left arm, BP Patient Position: Sitting)   Pulse 66   Temp 97.1 °F (36.2 °C) (Oral)   Resp 18   Ht 5' 1\" (1.549 m)   Wt 148 lb 6.4 oz (67.3 kg)   SpO2 97%   BMI 28.04 kg/m²      Gen: Well developed, nourished, no distress, alert   HENT: Exterior ears and tympanic membranes normal bilaterally. Supple neck. No thyromegaly or lymphadenopathy. Oropharynx clear and moist mucous membranes. Eyes: Conjunctiva normal. PERRL. CV: S1, S2.  RRR. No murmurs/rubs. No thrills palpated. No carotid bruits. Intact distal pulses. No edema. Pulm: No abnormalities on inspection. Clear to auscultation bilaterally. No wheezing/rhonchi. Normal effort. GI: Soft, nontender, nondistended. Normal active bowel sounds. No  masses on palpation. No hepatosplenomegaly. Psych: Appropriate affect, judgement and insight. Short-term memory intact. Assessment/Plan:    The patient is low risk for planned procedure and may proceed to OR without further testing. I will continue to follow along with the patient's treatment and care. For any questions please do not hesitate to call the office at [phone].       Greyson Aguirre MD

## 2019-09-04 NOTE — PATIENT INSTRUCTIONS
Vaccine Information Statement    Recombinant Zoster (Shingles) Vaccine, RZV: What you need to know     Many Vaccine Information Statements are available in Armenian and other languages. See www.immunize.org/vis  Hojas de Información Sobre Vacunas están disponibles en Español y en muchos otros idiomas. [email or web address] [email or web address]    1. Why get vaccinated? Shingles (also called herpes zoster, or just zoster) is a painful skin rash, often with blisters. Shingles is caused by the varicella zoster virus, the same virus that causes chickenpox. After you have chickenpox, the virus stays in your body and can cause shingles later in life. You cant catch shingles from another person. However, a person who has never had chickenpox (or chickenpox vaccine) could get chickenpox from someone with shingles. A shingles rash usually appears on one side of the face or body and heals within 2 to 4 weeks. Its main symptom is pain, which can be severe. Other symptoms can include fever, headache, chills, and upset stomach. Very rarely, a shingles infection can lead to pneumonia, hearing problems, blindness, brain inflammation (encephalitis), or death. For about 1 person in 5, severe pain can continue even long after the rash has cleared up. This long-lasting pain is called post-herpetic neuralgia (PHN). Shingles is far more common in people 48years of age and older than in younger people, and the risk increases with age. It is also more common in people whose immune system is weakened because of a disease such as cancer, or by drugs such as steroids or chemotherapy. At least 1 million people a year in the [facility] get shingles. 2. Shingles vaccine (recombinant)    Recombinant shingles vaccine was approved by FDA in 2017 for the prevention of shingles. In clinical trials, it was more than 90% effective in preventing shingles. It can also reduce the likelihood of PHN.     Two doses, 2 to 6 months apart, are recommended for adults 48 and older. This vaccine is also recommended for people who have already gotten the live shingles vaccine (Zostavax). There is no live virus in this vaccine. 3. Some people should not get this vaccine    Tell your vaccine provider if you:     Have any severe, life-threatening allergies. A person who has ever had a life-threatening allergic reaction after a dose of recombinant shingles vaccine, or has a severe allergy to any component of this vaccine, may be advised not to be vaccinated. Ask your health care provider if you want information about vaccine components.  Are pregnant or breastfeeding. There is not much information about use of recombinant shingles vaccine in pregnant or nursing women. Your healthcare provider might recommend delaying vaccination.  Are not feeling well. If you have a mild illness, such as a cold, you can probably get the vaccine today. If you are moderately or severely ill, you should probably wait until you recover. Your doctor can advise you. 4. Risks of a vaccine reaction    With any medicine, including vaccines, there is a chance of reactions. After recombinant shingles vaccination, a person might experience:    Pain, redness, soreness, or swelling at the site of the injection   Headache, muscle aches, fever, shivering, fatigue    In clinical trials, most people got a sore arm with mild or moderate pain after vaccination, and some also had redness and swelling where they got the shot. Some people felt tired, had muscle pain, a headache, shivering, fever, stomach pain, or nausea. About 1 out of 6 people who got recombinant zoster vaccine experienced side effects that prevented them from doing regular activities. Symptoms went away on their own in about 2 to 3 days. Side effects were more common in younger people.     You should still get the second dose of recombinant zoster vaccine even if you had one of these reactions after the first dose. Other things that could happen after this vaccine:     People sometimes faint after medical procedures, including vaccination. Sitting or lying down for about 15 minutes can help prevent fainting and injuries caused by a fall. Tell your provider if you feel dizzy or have vision changes or ringing in the ears.  Some people get shoulder pain that can be more severe and longer-lasting than routine soreness that can follow injections. This happens very rarely.  Any medication can cause a severe allergic reaction. Such reactions to a vaccine are estimated at about 1 in a million doses, and would happen within a few minutes to a few hours after the vaccination. As with any medicine, there is a very remote chance of a vaccine causing a serious injury or death. The safety of vaccines is always being monitored. For more information, visit: www.cdc.gov/vaccinesafety/     5. What if there is a serious problem? What should I look for?  Look for anything that concerns you, such as signs of a severe allergic reaction, very high fever, or unusual behavior. Signs of a severe allergic reaction can include hives, swelling of the face and throat, difficulty breathing, a fast heartbeat, dizziness, and weakness. These would usually start a few minutes to a few hours after the vaccination. What should I do?  If you think it is a severe allergic reaction or other emergency that cant wait, call 9-1-1 or get to the nearest hospital. Otherwise, call your health care provider. Afterward, the reaction should be reported to the Vaccine Adverse Event Reporting System (VAERS). Your doctor should file this report, or you can do it yourself through the VAERS website at www.vaers. hhs.gov, or by calling 8-265.891.4892. VAERS does not give medical advice. 6. How can I learn more?  Ask your health care provider.  He or she can give you the vaccine package insert or suggest other sources of information.  Call your local or state health department.    Contact the Centers for Disease Control and Prevention (CDC):  - Call 0-522.427.3812 (1-800-CDC-INFO) or  - Visit CDCs website at www.cdc.gov/vaccines    Vaccine Information Statement  Recombinant Zoster Vaccine   2/12/2018    [facility] for Disease Control and Prevention    Office Use Only

## 2019-09-04 NOTE — PROGRESS NOTES
Maryjane Caldera is a 59 y.o. female (: 1955) presenting to address:    Chief Complaint   Patient presents with    Pre-op Exam    Immunization/Injection     Flu vaccine       Vitals:    19 1027   BP: 115/67   Pulse: 66   Resp: 18   Temp: 97.1 °F (36.2 °C)   TempSrc: Oral   SpO2: 97%   Weight: 148 lb 6.4 oz (67.3 kg)   Height: 5' 1\" (1.549 m)   PainSc:   5   PainLoc: Foot       Learning Assessment:     Learning Assessment 2017   PRIMARY LEARNER Patient   HIGHEST LEVEL OF EDUCATION - PRIMARY LEARNER  4 YEARS OF COLLEGE   PRIMARY LANGUAGE ENGLISH   LEARNER PREFERENCE PRIMARY DEMONSTRATION     PICTURES   ANSWERED BY Self   RELATIONSHIP SELF     Depression Screening:     3 most recent PHQ Screens 2019   PHQ Not Done -   Little interest or pleasure in doing things Not at all   Feeling down, depressed, irritable, or hopeless Not at all   Total Score PHQ 2 0     Fall Risk Assessment:     Fall Risk Assessment, last 12 mths 3/18/2019   Able to walk? Yes   Fall in past 12 months? No     Abuse Screening:     Abuse Screening Questionnaire 3/18/2019   Do you ever feel afraid of your partner? N   Are you in a relationship with someone who physically or mentally threatens you? N   Is it safe for you to go home? Y     Coordination of Care Questionaire:   1. Have you been to the ER, urgent care clinic since your last visit? Hospitalized since your last visit? NO    2. Have you seen or consulted any other health care providers outside of the [address] [place] [address] since your last visit? Include any pap smears or colon screening. YES- ophthalmology, orthopedic, dentist    Advanced Directive:   1. Do you have an Advanced Directive? YES    2. Would you like information on Advanced Directives? NO    FLU vac and Shingrix vac Immunization/s administered today by Ignacia Modi CMA with patient/guardian's consent.     Verified with  Melita Perdomo LPN Flu vac given right deltoid 0.5 ml lot #  [id number] Exp. 19 NDC [id number] Shingrix given left deltoid 0.5 ml/diluent lot # [id number] exp 10/17/21[facility] 14410-813-02/ [id number]  Patient tolerated procedure well. No bleeding observed at injection site. No reactions noted.

## 2019-09-23 DIAGNOSIS — N95.1 MENOPAUSAL SYMPTOM: ICD-10-CM

## 2019-09-24 RX ORDER — LEVOTHYROXINE SODIUM 75 UG/1
TABLET ORAL
Qty: 90 TAB | Refills: 1 | Status: SHIPPED | OUTPATIENT
Start: 2019-09-24 | End: 2020-05-27

## 2019-09-24 RX ORDER — FLUVOXAMINE MALEATE 50 MG/1
TABLET ORAL
Qty: 90 TAB | Refills: 3 | Status: SHIPPED | OUTPATIENT
Start: 2019-09-24 | End: 2020-08-18

## 2019-09-26 NOTE — TELEPHONE ENCOUNTER
Carlos sent a refill asking for estrace cream? Called pt to confirm if she is asking for the cream, as Dr Riaz Arroyo write the patches for her. Placing refill request for the patches.

## 2019-09-27 RX ORDER — ESTRADIOL 0.03 MG/D
FILM, EXTENDED RELEASE TRANSDERMAL
Qty: 24 PATCH | Refills: 3 | Status: SHIPPED | OUTPATIENT
Start: 2019-09-27 | End: 2020-05-11 | Stop reason: SDUPTHER

## 2019-09-30 ENCOUNTER — TELEPHONE (OUTPATIENT)
Dept: FAMILY MEDICINE CLINIC | Age: 64
End: 2019-09-30

## 2019-09-30 DIAGNOSIS — N95.1 MENOPAUSAL SYMPTOM: ICD-10-CM

## 2019-09-30 NOTE — TELEPHONE ENCOUNTER
Spoke to pt. Patches manage her sx pretty well but the cream helps with targeted issue of dryness. She is willing to try otc lubricant and doesn't wish to up her patch strength at this time.

## 2019-09-30 NOTE — TELEPHONE ENCOUNTER
Pt returned call about Corina sending over refill for estrace cream. This is on her historical meds list.  She was given this by prior pcp for vaginal dryness and interstitial cystitis. Pt uses it about once per week. Her tube is empty and she would like a refill sent to [facility]. Thank you.

## 2019-09-30 NOTE — TELEPHONE ENCOUNTER
Since she is on transdermal estrogen, would prefer we manage it with one route of estrogen.   Can increase dose of patch if needed if sx aren't controlled

## 2019-10-01 RX ORDER — ESTRADIOL 0.1 MG/G
CREAM VAGINAL
Qty: 42.5 G | Refills: 1 | Status: SHIPPED | OUTPATIENT
Start: 2019-10-01 | End: 2020-08-18 | Stop reason: ALTCHOICE

## 2019-11-04 RX ORDER — PROGESTERONE 100 MG/1
CAPSULE ORAL
Qty: 42 CAP | Refills: 2 | Status: SHIPPED | OUTPATIENT
Start: 2019-11-04 | End: 2020-08-18 | Stop reason: ALTCHOICE

## 2019-12-20 RX ORDER — THYROID, PORCINE 15 MG/1
TABLET ORAL
Qty: 180 TAB | Refills: 1 | Status: SHIPPED | OUTPATIENT
Start: 2019-12-20 | End: 2020-08-18 | Stop reason: ALTCHOICE

## 2020-02-15 ENCOUNTER — HOSPITAL ENCOUNTER (OUTPATIENT)
Dept: LAB | Age: 65
Discharge: HOME | End: 2020-02-15
Payer: COMMERCIAL

## 2020-02-15 DIAGNOSIS — Z12.11 SCREEN FOR COLON CANCER: ICD-10-CM

## 2020-02-15 PROCEDURE — 82274 ASSAY TEST FOR BLOOD FECAL: CPT

## 2020-02-20 LAB — HEMOCCULT STL QL IA: NEGATIVE

## 2020-04-02 ENCOUNTER — VIRTUAL VISIT (OUTPATIENT)
Dept: FAMILY MEDICINE CLINIC | Age: 65
End: 2020-04-02

## 2020-04-02 DIAGNOSIS — R82.998 CALCIUM OXALATE CRYSTALS IN URINE: ICD-10-CM

## 2020-04-02 DIAGNOSIS — U07.1 COVID-19: Primary | ICD-10-CM

## 2020-04-02 RX ORDER — DUREZOL 0.5 MG/ML
1 EMULSION OPHTHALMIC
COMMUNITY
Start: 2020-03-01

## 2020-04-02 RX ORDER — ACETAMINOPHEN 325 MG/1
650 TABLET ORAL
COMMUNITY

## 2020-04-02 RX ORDER — CEFPODOXIME PROXETIL 200 MG/1
1 TABLET, FILM COATED ORAL 2 TIMES DAILY
COMMUNITY
Start: 2020-03-20 | End: 2020-08-18 | Stop reason: ALTCHOICE

## 2020-04-02 NOTE — PROGRESS NOTES
Derian Saldaña is a 59 y.o. female (: 1955) presenting to address:    Chief Complaint   Patient presents with    Abdominal Pain     rt       Vitals:    20 1210   PainSc:   3   PainLoc: Flank       Hearing/Vision:   No exam data present    Learning Assessment:     Learning Assessment 2017   PRIMARY LEARNER Patient   HIGHEST LEVEL OF EDUCATION - PRIMARY LEARNER  4 YEARS OF COLLEGE   PRIMARY LANGUAGE ENGLISH   LEARNER PREFERENCE PRIMARY DEMONSTRATION     PICTURES   ANSWERED BY Self   RELATIONSHIP SELF     Depression Screening:     3 most recent PHQ Screens 2019   PHQ Not Done -   Little interest or pleasure in doing things Not at all   Feeling down, depressed, irritable, or hopeless Not at all   Total Score PHQ 2 0     Fall Risk Assessment:     Fall Risk Assessment, last 12 mths 3/18/2019   Able to walk? Yes   Fall in past 12 months? No     Abuse Screening:     Abuse Screening Questionnaire 3/18/2019   Do you ever feel afraid of your partner? N   Are you in a relationship with someone who physically or mentally threatens you? N   Is it safe for you to go home? Y     Coordination of Care Questionaire:   1. Have you been to the ER, urgent care clinic since your last visit? Hospitalized since your last visit? Yes, [facility] 3/20/2020    2. Have you seen or consulted any other health care providers outside of the [address] [place] [address] since your last visit? Include any pap smears or colon screening. No     Advanced Directive:   1. Do you have an Advanced Directive? No     2. Would you like information on Advanced Directives?    Yes       Health Maintenance Due   Topic Date Due    Shingrix Vaccine Age 49> (2 of 2) 10/30/2019     [employer]  [phone] text message please

## 2020-05-11 DIAGNOSIS — N95.1 MENOPAUSAL SYMPTOM: ICD-10-CM

## 2020-05-11 RX ORDER — ESTRADIOL 0.03 MG/D
FILM, EXTENDED RELEASE TRANSDERMAL
Qty: 24 PATCH | Refills: 3 | Status: SHIPPED | OUTPATIENT
Start: 2020-05-11 | End: 2020-08-18 | Stop reason: ALTCHOICE

## 2020-05-27 RX ORDER — LEVOTHYROXINE SODIUM 75 UG/1
TABLET ORAL
Qty: 90 TAB | Refills: 0 | Status: SHIPPED | OUTPATIENT
Start: 2020-05-27 | End: 2020-08-18 | Stop reason: SDUPTHER

## 2020-07-10 RX ORDER — CYANOCOBALAMIN 1000 UG/ML
INJECTION, SOLUTION INTRAMUSCULAR; SUBCUTANEOUS
Qty: 1 VIAL | Refills: 2 | Status: SHIPPED | OUTPATIENT
Start: 2020-07-10 | End: 2020-10-13

## 2020-07-22 DIAGNOSIS — Z01.84 IMMUNITY STATUS TESTING: Primary | ICD-10-CM

## 2020-08-18 ENCOUNTER — OFFICE VISIT (OUTPATIENT)
Dept: FAMILY MEDICINE CLINIC | Age: 65
End: 2020-08-18

## 2020-08-18 ENCOUNTER — HOSPITAL ENCOUNTER (OUTPATIENT)
Dept: LAB | Age: 65
Discharge: HOME | End: 2020-08-18
Payer: MEDICARE

## 2020-08-18 VITALS
SYSTOLIC BLOOD PRESSURE: 115 MMHG | TEMPERATURE: 97.2 F | BODY MASS INDEX: 26.73 KG/M2 | RESPIRATION RATE: 17 BRPM | HEART RATE: 68 BPM | WEIGHT: 141.6 LBS | HEIGHT: 61 IN | OXYGEN SATURATION: 99 % | DIASTOLIC BLOOD PRESSURE: 71 MMHG

## 2020-08-18 DIAGNOSIS — E03.9 ACQUIRED HYPOTHYROIDISM: ICD-10-CM

## 2020-08-18 DIAGNOSIS — N93.9 ABNORMAL UTERINE BLEEDING: ICD-10-CM

## 2020-08-18 DIAGNOSIS — Z00.00 WELCOME TO MEDICARE PREVENTIVE VISIT: ICD-10-CM

## 2020-08-18 DIAGNOSIS — E03.9 ACQUIRED HYPOTHYROIDISM: Primary | ICD-10-CM

## 2020-08-18 DIAGNOSIS — F34.1 DYSTHYMIA: ICD-10-CM

## 2020-08-18 DIAGNOSIS — E53.8 B12 DEFICIENCY: ICD-10-CM

## 2020-08-18 DIAGNOSIS — E78.00 PURE HYPERCHOLESTEROLEMIA: ICD-10-CM

## 2020-08-18 DIAGNOSIS — M81.0 AGE-RELATED OSTEOPOROSIS WITHOUT CURRENT PATHOLOGICAL FRACTURE: ICD-10-CM

## 2020-08-18 LAB
ALBUMIN SERPL-MCNC: 4.2 G/DL (ref 3.4–5)
ALBUMIN/GLOB SERPL: 1.6 {RATIO} (ref 0.8–1.7)
ALP SERPL-CCNC: 80 U/L (ref 45–117)
ALT SERPL-CCNC: 20 U/L (ref 13–56)
ANION GAP SERPL CALC-SCNC: 4 MMOL/L (ref 3–18)
AST SERPL-CCNC: 14 U/L (ref 10–38)
BILIRUB SERPL-MCNC: 0.5 MG/DL (ref 0.2–1)
BUN SERPL-MCNC: 11 MG/DL (ref 7–18)
BUN/CREAT SERPL: 17 (ref 12–20)
CALCIUM SERPL-MCNC: 9 MG/DL (ref 8.5–10.1)
CHLORIDE SERPL-SCNC: 106 MMOL/L (ref 100–111)
CHOLEST SERPL-MCNC: 200 MG/DL
CO2 SERPL-SCNC: 29 MMOL/L (ref 21–32)
CREAT SERPL-MCNC: 0.65 MG/DL (ref 0.6–1.3)
ERYTHROCYTE [DISTWIDTH] IN BLOOD BY AUTOMATED COUNT: 12.8 % (ref 11.6–14.5)
GLOBULIN SER CALC-MCNC: 2.7 G/DL (ref 2–4)
GLUCOSE SERPL-MCNC: 95 MG/DL (ref 74–99)
HCT VFR BLD AUTO: 42.9 % (ref 35–45)
HDLC SERPL-MCNC: 63 MG/DL (ref 40–60)
HDLC SERPL: 3.2 {RATIO} (ref 0–5)
HGB BLD-MCNC: 14.2 G/DL (ref 12–16)
LDLC SERPL CALC-MCNC: 116.2 MG/DL (ref 0–100)
LIPID PROFILE,FLP: ABNORMAL
MCH RBC QN AUTO: 29.8 PG (ref 24–34)
MCHC RBC AUTO-ENTMCNC: 33.1 G/DL (ref 31–37)
MCV RBC AUTO: 89.9 FL (ref 74–97)
PLATELET # BLD AUTO: 316 K/UL (ref 135–420)
PMV BLD AUTO: 10.7 FL (ref 9.2–11.8)
POTASSIUM SERPL-SCNC: 4.6 MMOL/L (ref 3.5–5.5)
PROT SERPL-MCNC: 6.9 G/DL (ref 6.4–8.2)
RBC # BLD AUTO: 4.77 M/UL (ref 4.2–5.3)
SODIUM SERPL-SCNC: 139 MMOL/L (ref 136–145)
TRIGL SERPL-MCNC: 104 MG/DL (ref ?–150)
TSH SERPL DL<=0.05 MIU/L-ACNC: 0.71 UIU/ML (ref 0.36–3.74)
VIT B12 SERPL-MCNC: 260 PG/ML (ref 211–911)
VLDLC SERPL CALC-MCNC: 20.8 MG/DL
WBC # BLD AUTO: 5.8 K/UL (ref 4.6–13.2)

## 2020-08-18 PROCEDURE — 80053 COMPREHEN METABOLIC PANEL: CPT

## 2020-08-18 PROCEDURE — 80061 LIPID PANEL: CPT

## 2020-08-18 PROCEDURE — 85027 COMPLETE CBC AUTOMATED: CPT

## 2020-08-18 PROCEDURE — 82607 VITAMIN B-12: CPT

## 2020-08-18 PROCEDURE — 36415 COLL VENOUS BLD VENIPUNCTURE: CPT

## 2020-08-18 PROCEDURE — 84443 ASSAY THYROID STIM HORMONE: CPT

## 2020-08-18 RX ORDER — FLUVOXAMINE MALEATE 100 MG/1
100 TABLET, COATED ORAL
Qty: 90 TAB | Refills: 0 | Status: SHIPPED | OUTPATIENT
Start: 2020-08-18 | End: 2020-12-02 | Stop reason: SDUPTHER

## 2020-08-18 RX ORDER — POTASSIUM &MAGNESIUM ASPARTATE 250-250 MG
500 CAPSULE ORAL DAILY
COMMUNITY

## 2020-08-18 RX ORDER — LIOTHYRONINE SODIUM 25 UG/1
25 TABLET ORAL DAILY
Qty: 90 TAB | Refills: 0 | Status: SHIPPED | OUTPATIENT
Start: 2020-08-18 | End: 2020-10-13

## 2020-08-18 RX ORDER — LEVOTHYROXINE SODIUM 75 UG/1
TABLET ORAL
Qty: 90 TAB | Refills: 0 | Status: SHIPPED | OUTPATIENT
Start: 2020-08-18 | End: 2020-10-13

## 2020-08-18 NOTE — PROGRESS NOTES
3 weeks ago oral surgery   ED visit COVID 2020   Jordin Burgos is a 59 y.o. female (: 1955) presenting to address:    Chief Complaint   Patient presents with    Complete Physical       Vitals:    20 0838   BP: 115/71   Pulse: 68   Resp: 17   Temp: 97.2 °F (36.2 °C)   SpO2: 99%   Weight: 141 lb 9.6 oz (64.2 kg)   Height: 5' 1\" (1.549 m)   PainSc:   3       Hearing/Vision:   No exam data present    Learning Assessment:     Learning Assessment 2017   PRIMARY LEARNER Patient   HIGHEST LEVEL OF EDUCATION - PRIMARY LEARNER  4 YEARS OF COLLEGE   PRIMARY LANGUAGE ENGLISH   LEARNER PREFERENCE PRIMARY DEMONSTRATION     PICTURES   ANSWERED BY Self   RELATIONSHIP SELF     Depression Screening:     3 most recent PHQ Screens 2020   PHQ Not Done -   Little interest or pleasure in doing things Not at all   Feeling down, depressed, irritable, or hopeless Not at all   Total Score PHQ 2 0     Fall Risk Assessment:     Fall Risk Assessment, last 12 mths 2020   Able to walk? Yes   Fall in past 12 months? No     Abuse Screening:     Abuse Screening Questionnaire 2020   Do you ever feel afraid of your partner? N   Are you in a relationship with someone who physically or mentally threatens you? N   Is it safe for you to go home? Y     Coordination of Care Questionaire:   1. Have you been to the ER, urgent care clinic since your last visit? Hospitalized since your last visit? Yes, had COVID in 2020, went to ED   Have you seen or consulted any other health care providers outside of the [address] [place] [address] since your last visit? Include any pap smears or colon screening. Yes, oral surgery     Advanced Directive:   1. Do you have an Advanced Directive? No   2. Would you like information on Advanced Directives?   No     Health Maintenance Due   Topic Date Due    Shingrix Vaccine Age 49> (2 of 2) 10/30/2019    Influenza Age 5 to Adult  2020

## 2020-08-18 NOTE — PATIENT INSTRUCTIONS
Vaccine Information Statement    Recombinant Zoster (Shingles) Vaccine: What You Need to Know    Many Vaccine Information Statements are available in Malay and other languages. See www.immunize.org/vis  Hojas de información sobre vacunas están disponibles en español y en muchos otros idiomas. Visite www.immunize.org/vis    1. Why get vaccinated? Recombinant zoster (shingles) vaccine can prevent shingles. Shingles (also called herpes zoster, or just zoster) is a painful skin rash, usually with blisters. In addition to the rash, shingles can cause fever, headache, chills, or upset stomach. More rarely, shingles can lead to pneumonia, hearing problems, blindness, brain inflammation (encephalitis), or death. The most common complication of shingles is long-term nerve pain called postherpetic neuralgia (PHN). PHN occurs in the areas where the shingles rash was, even after the rash clears up. It can last for months or years after the rash goes away. The pain from PHN can be severe and debilitating. About 10 to 18% of people who get shingles will experience PHN. The risk of PHN increases with age. An older adult with shingles is more likely to develop PHN and have longer lasting and more severe pain than a younger person with shingles. Shingles is caused by the varicella zoster virus, the same virus that causes chickenpox. After you have chickenpox, the virus stays in your body and can cause shingles later in life. Shingles cannot be passed from one person to another, but the virus that causes shingles can spread and cause chickenpox in someone who had never had chickenpox or received chickenpox vaccine. 2. Recombinant shingles vaccine    Recombinant shingles vaccine provides strong protection against shingles. By preventing shingles, recombinant shingles vaccine also protects against PHN. Recombinant shingles vaccine is the preferred vaccine for the prevention of shingles.   However, a different vaccine, live shingles vaccine, may be used in some circumstances. The recombinant shingles vaccine is recommended for adults 50 years and older without serious immune problems. It is given as a two-dose series. This vaccine is also recommended for people who have already gotten another type of shingles vaccine, the live shingles vaccine. There is no live virus in this vaccine. Shingles vaccine may be given at the same time as other vaccines. 3. Talk with your health care provider    Tell your vaccine provider if the person getting the vaccine:   Has had an allergic reaction after a previous dose of recombinant shingles vaccine, or has any severe, life-threatening allergies.  Is pregnant or breastfeeding.  Is currently experiencing an episode of shingles. In some cases, your health care provider may decide to postpone shingles vaccination to a future visit. People with minor illnesses, such as a cold, may be vaccinated. People who are moderately or severely ill should usually wait until they recover before getting recombinant shingles vaccine. Your health care provider can give you more information. 4. Risks of a vaccine reaction     A sore arm with mild or moderate pain is very common after recombinant shingles vaccine, affecting about 80% of vaccinated people. Redness and swelling can also happen at the site of the injection.  Tiredness, muscle pain, headache, shivering, fever, stomach pain, and nausea happen after vaccination in more than half of people who receive recombinant shingles vaccine. In clinical trials, about 1 out of 6 people who got recombinant zoster vaccine experienced side effects that prevented them from doing regular activities. Symptoms usually went away on their own in 2 to 3 days. You should still get the second dose of recombinant zoster vaccine even if you had one of these reactions after the first dose.       People sometimes faint after medical procedures, including vaccination. Tell your provider if you feel dizzy or have vision changes or ringing in the ears. As with any medicine, there is a very remote chance of a vaccine causing a severe allergic reaction, other serious injury, or death. 5. What if there is a serious problem? An allergic reaction could occur after the vaccinated person leaves the clinic. If you see signs of a severe allergic reaction (hives, swelling of the face and throat, difficulty breathing, a fast heartbeat, dizziness, or weakness), call 9-1-1 and get the person to the nearest hospital.    For other signs that concern you, call your health care provider. Adverse reactions should be reported to the Vaccine Adverse Event Reporting System (VAERS). Your health care provider will usually file this report, or you can do it yourself. Visit the VAERS website at www.vaers. [facility].gov or call 5-886.760.4756. VAERS is only for reporting reactions, and VAERS staff do not give medical advice. 6. How can I learn more?  Ask your health care provider.  Call your local or state health department.  Contact the Centers for Disease Control and Prevention (CDC):  - Call 1-725.914.6251 (1-800-CDC-INFO) or  - Visit CDCs website at www.cdc.gov/vaccines    Vaccine Information Statement   Recombinant Zoster Vaccine   10/30/2019    [facility]    Office Use Only      Medicare Wellness Visit, Female     The best way to live healthy is to have a lifestyle where you eat a well-balanced diet, exercise regularly, limit alcohol use, and quit all forms of tobacco/nicotine, if applicable. Regular preventive services are another way to keep healthy. Preventive services (vaccines, screening tests, monitoring & exams) can help personalize your care plan, which helps you manage your own care.  Screening tests can find health problems at the earliest stages, when they are easiest to treat. [facility] follows the current, evidence-based guidelines published by the [facility] ([facility]) when recommending preventive services for our patients. Because we follow these guidelines, sometimes recommendations change over time as research supports it. (For example, mammograms used to be recommended annually. Even though Medicare will still pay for an annual mammogram, the newer guidelines recommend a mammogram every two years for women of average risk). Of course, you and your doctor may decide to screen more often for some diseases, based on your risk and your co-morbidities (chronic disease you are already diagnosed with). Preventive services for you include:  - Medicare offers their members a free annual wellness visit, which is time for you and your primary care provider to discuss and plan for your preventive service needs. Take advantage of this benefit every year!  -All adults over the age of 72 should receive the recommended pneumonia vaccines. Current USPSTF guidelines recommend a series of two vaccines for the best pneumonia protection.   -All adults should have a flu vaccine yearly and a tetanus vaccine every 10 years.   -All adults age 48 and older should receive the shingles vaccines (series of two vaccines).       -All adults age 38-68 who are overweight should have a diabetes screening test once every three years.   -All adults born between [birth year] and 1965 should be screened once for Hepatitis C.  -Other screening tests and preventive services for persons with diabetes include: an eye exam to screen for diabetic retinopathy, a kidney function test, a foot exam, and stricter control over your cholesterol.   -Cardiovascular screening for adults with routine risk involves an electrocardiogram (ECG) at intervals determined by your doctor.   -Colorectal cancer screenings should be done for adults age 54-65 with no increased risk factors for colorectal cancer. There are a number of acceptable methods of screening for this type of cancer. Each test has its own benefits and drawbacks. Discuss with your doctor what is most appropriate for you during your annual wellness visit. The different tests include: colonoscopy (considered the best screening method), a fecal occult blood test, a fecal DNA test, and sigmoidoscopy.    -A bone mass density test is recommended when a woman turns 65 to screen for osteoporosis. This test is only recommended one time, as a screening. Some providers will use this same test as a disease monitoring tool if you already have osteoporosis. -Breast cancer screenings are recommended every other year for women of normal risk, age 54-69.  -Cervical cancer screenings for women over age 72 are only recommended with certain risk factors.      Here is a list of your current Health Maintenance items (your personalized list of preventive services) with a due date:  Health Maintenance Due   Topic Date Due    Shingles Vaccine (2 of 2) 10/30/2019    Flu Vaccine  08/01/2020         [facility]  [address] [facility]  [phone]

## 2020-08-18 NOTE — PROGRESS NOTES
Assessment/Plan:    1. Acquired hypothyroidism  -change armour to cytomel for coverage. Ck labs in 2 mos  - METABOLIC PANEL, COMPREHENSIVE; Future  - TSH 3RD GENERATION; Future  - liothyronine (CYTOMEL) 25 mcg tablet; Take 1 Tab by mouth daily. Dispense: 90 Tab; Refill: 0  - T3, FREE; Future  - T4, FREE; Future  - TSH 3RD GENERATION; Future  - levothyroxine (SYNTHROID) 75 mcg tablet; TAKE ONE TABLET BY MOUTH ONCE DAILY BEFORE BREAKFAST  Dispense: 90 Tab; Refill: 0    2. Dysthymia  -increase dose. If not conrolled after one month, add wellbutrin  - fluvoxaMINE (LUVOX) 100 mg tablet; Take 1 Tab by mouth nightly. Dispense: 90 Tab; Refill: 0    3. Abnormal uterine bleeding  -stop estrogen. Make appt with [facility] asap. Discussed risk of endometrial cancer.  - CBC W/O DIFF; Future    4. B12 deficiency  - VITAMIN B12; Future    5. Welcome to Medicare preventive visit    6. Pure hypercholesterolemia  - LIPID PANEL; Future    7. Age-related osteoporosis without current pathological fracture  -bisphosponate after dental surgery done in 2021    The plan was discussed with the patient. The patient verbalized understanding and is in agreement with the plan. All medication potential side effects were discussed with the patient. Health Maintenance:   Health Maintenance   Topic Date Due    Shingrix Vaccine Age 49> (2 of 2) 10/30/2019    Influenza Age 5 to Adult  08/01/2020    FOBT Q1Y Age 50-75  02/15/2021    PAP AKA CERVICAL CYTOLOGY  03/01/2021    Breast Cancer Screen Mammogram  04/23/2021    Lipid Screen  03/18/2024    DTaP/Tdap/Td series (2 - Td) 02/28/2027    Bone Densitometry (Dexa) Screening  Completed    Hepatitis C Screening  Addressed    Pneumococcal 0-64 years  Aged Out       Nichole Delaney is a 59 y.o. female and presents with Annual Wellness Visit     Subjective:  Pt is having vaginal bleeding x 4 days. Having L pelvic tenderness x 10days.     Pt also switched to medicare and they aren't covering armour thyroid. Has h/o interstitial cystitis - on elmiron, but not covered either. Anxiety and depression - on luvox. She is worried about stopping hormones due to worsening mood issues she previously experienced. Now under more stress, planning a move to [place], building a house. ROS:  Constitutional: No recent weight change. No weakness/fatigue. No f/c. Skin: No rashes, change in nails/hair, itching   HENT: No HA, dizziness. No hearing loss/tinnitus. No nasal congestion/discharge. Eyes: No change in vision, double/blurred vision or eye pain/redness. Cardiovascular: No CP/palpitations. No ORELLANA/orthopnea/PND. Respiratory: No cough/sputum, dyspnea, wheezing. Gastointestinal: No dysphagia, reflux. No n/v. No constipation/diarrhea. No melena/rectal bleeding. Genitourinary: No dysuria, urinary hesitancy, nocturia, hematuria. No incontinence. Musculoskeletal: No joint pain/stiffness. No muscle pain/tenderness. Endo: No heat/cold intolerance, no polyuria/polydypsia. Heme: No h/o anemia. No easy bleeding/bruising. Allergy/Immunology: No seasonal rhinitis. Denies frequent colds, sinus/ear infections. Neurological: No seizures/numbness/weakness. No paresthesias. Psychiatric:  No depression, +anxiety. The problem list was updated as a part of today's visit. Patient Active Problem List   Diagnosis Code    Osteoporosis M81.0    Acquired hypothyroidism E03.9    History of anemia Z86.2    B12 deficiency E53.8    History of positive PPD Z92.89    Tinnitus H93.19    Interstitial cystitis N30.10    Menopausal symptom N95.1    Bunion of great toe of right foot M21.611    Ponce's esophagus with dysplasia K22.719    Adverse effect of anesthesia T41.45XA    Calcium oxalate crystals in urine R82.998       The PSH, FH were reviewed. SH:  Social History     Tobacco Use    Smoking status: Never Smoker    Smokeless tobacco: Never Used   Substance Use Topics    Alcohol use:  No  Drug use: No       Medications/Allergies:  Current Outpatient Medications on File Prior to Visit   Medication Sig Dispense Refill    cranberry 500 mg capsule Take 500 mg by mouth daily.  cyanocobalamin (VITAMIN B12) 1,000 mcg/mL injection INJECT 1 ML INTRAMUSCULARLY EVERY 30 DAYS 1 Vial 2    levothyroxine (SYNTHROID) 75 mcg tablet TAKE ONE TABLET BY MOUTH ONCE DAILY BEFORE BREAKFAST 90 Tab 0    acetaminophen (TylenoL) 325 mg tablet Take 650 mg by mouth every four (4) hours as needed for Pain.  DurezoL 0.05 % ophthalmic emulsion Administer 1 Drop to both eyes daily as needed.  ARMOUR THYROID 15 mg tablet TAKE ONE TABLET BY MOUTH TWICE DAILY 180 Tab 1    fluvoxaMINE (LUVOX) 50 mg tablet TAKE ONE TABLET BY MOUTH EVERY NIGHT AT BEDTIME 90 Tab 3    ibuprofen (MOTRIN) 200 mg tablet Take  by mouth.  progesterone (PROMETRIUM) 100 mg capsule TAKE ONE CAPSULE BY MOUTH ONCE DAILY FOR TWO WEEKS, THEN TAKE TWO WEEKS OFF 42 Cap 1    co-enzyme Q-10 (CO Q-10) 50 mg capsule Take 50 mg by mouth daily.  [DISCONTINUED] estradioL 0.025 mg/24 hr ptsw APPLY ONE PATCH TWICE WEEKLY 24 Patch 3    [DISCONTINUED] progesterone (PROMETRIUM) 100 mg capsule TAKE ONE CAPSULE BY MOUTH ONCE DAILY FOR 2 WEEKS, THEN TAKE TWO WEEKS OFF 42 Cap 2    [DISCONTINUED] estradiol (ESTRACE) 0.01 % (0.1 mg/gram) vaginal cream 1 gram vaginally at bedtime once weekly 42.5 g 1    [DISCONTINUED] ELMIRON 100 mg capsule TAKE ONE CAPSULE BY MOUTH ONE TIME A DAY BEFORE MEALS ( BREAKFAST, LUNCH AND DINNER) 90 Cap 3     No current facility-administered medications on file prior to visit.          Allergies   Allergen Reactions    Penicillins Anaphylaxis    Codeine Rash       Objective:  Visit Vitals  /71 (BP 1 Location: Left arm, BP Patient Position: Sitting)   Pulse 68   Temp 97.2 °F (36.2 °C)   Resp 17   Ht 5' 1\" (1.549 m)   Wt 141 lb 9.6 oz (64.2 kg)   SpO2 99%   BMI 26.76 kg/m²      Constitutional: Well developed, nourished, no distress, alert   HENT: Exterior ears and tympanic membranes normal bilaterally. Supple neck. No thyromegaly or lymphadenopathy. Eyes: Conjunctiva normal. PERRL. Eyelids normal.   CV: S1, S2.  RRR. No murmurs/rubs. No edema. Pulm: No abnormalities on inspection. Clear to auscultation bilaterally. No wheezing/rhonchi. Normal effort. GI: Soft, nontender, nondistended. Normal active bowel sounds. MS: Gait normal.  Joints without deformity/tenderness. Neuro: A/O x 3. No focal motor or sensory deficits. Speech normal.   Skin: No lesions/rashes on inspection. Psych: Appropriate affect, judgement and insight. Short-term memory intact. This is a \"Welcome to [employer]"  Initial Preventive Physical Examination (IPPE) providing Personalized Prevention Plan Services (Performed in the first 12 months of enrollment)    I have reviewed the patient's medical history in detail and updated the computerized patient record. History     Past Medical History:   Diagnosis Date    Acid reflux     Adverse effect of anesthesia     LOW TOLERANCE--HARD TO WAKE UP    Depression     H/O dizziness     DUE TO EAR PROBLEMS    Incomplete bladder emptying     Interstitial cystitis     Menopause     Muscular incoordination     Nocturia     Osteoarthritis     Pelvic floor dysfunction     Thyroid disease     Tinnitus       Past Surgical History:   Procedure Laterality Date    ABDOMEN SURGERY PROC UNLISTED      EXPLORATORY    HX APPENDECTOMY      HX BACK SURGERY  2011    discectomy    HX BREAST BIOPSY Right     Results were benign    HX  SECTION      HX COLONOSCOPY      AL EXCISION BENIGN LESION COMPLICATED      Excision of left hip mass. Size is about 5 cm       Current Outpatient Medications   Medication Sig Dispense Refill    cranberry 500 mg capsule Take 500 mg by mouth daily.       cyanocobalamin (VITAMIN B12) 1,000 mcg/mL injection INJECT 1 ML INTRAMUSCULARLY EVERY 30 DAYS 1 Vial 2    levothyroxine (SYNTHROID) 75 mcg tablet TAKE ONE TABLET BY MOUTH ONCE DAILY BEFORE BREAKFAST 90 Tab 0    acetaminophen (TylenoL) 325 mg tablet Take 650 mg by mouth every four (4) hours as needed for Pain.  DurezoL 0.05 % ophthalmic emulsion Administer 1 Drop to both eyes daily as needed.  ARMOUR THYROID 15 mg tablet TAKE ONE TABLET BY MOUTH TWICE DAILY 180 Tab 1    fluvoxaMINE (LUVOX) 50 mg tablet TAKE ONE TABLET BY MOUTH EVERY NIGHT AT BEDTIME 90 Tab 3    ibuprofen (MOTRIN) 200 mg tablet Take  by mouth.  progesterone (PROMETRIUM) 100 mg capsule TAKE ONE CAPSULE BY MOUTH ONCE DAILY FOR TWO WEEKS, THEN TAKE TWO WEEKS OFF 42 Cap 1    co-enzyme Q-10 (CO Q-10) 50 mg capsule Take 50 mg by mouth daily.        Allergies   Allergen Reactions    Penicillins Anaphylaxis    Codeine Rash       Family History   Problem Relation Age of Onset    Osteoporosis Mother     Heart Disease Mother     Hypertension Mother    Encarnacion Arthritis-osteo Mother     Asthma Father     Hypertension Father     Cancer Father 67        colon    Osteoporosis Sister     Thyroid Disease Sister     Arthritis-osteo Sister     Bipolar Disorder Brother     Cancer Maternal Grandmother         gastric    Cancer Paternal Grandmother         cervical     Social History     Tobacco Use    Smoking status: Never Smoker    Smokeless tobacco: Never Used   Substance Use Topics    Alcohol use: No       Depression Risk Screen     3 most recent PHQ Screens 8/18/2020   PHQ Not Done -   Little interest or pleasure in doing things Not at all   Feeling down, depressed, irritable, or hopeless Not at all   Total Score PHQ 2 0       Alcohol Risk Factor Screening:   Do you average 1 drink per night or more than 7 drinks a week:  No    On any one occasion in the past three months have you have had more than 3 drinks containing alcohol:  No      Functional Ability and Level of Safety   Diet: The patient is prescribed and follows a special diet. - is vegan. Hearing: Hearing is good. Vision Screening:  Vision is good. No exam data present     Activities of Daily Living: The home contains: no safety equipment. Patient does total self care     Ambulation: with no difficulty     Exercise level: moderately active     Fall Risk Screen:  Fall Risk Assessment, last 12 mths 8/18/2020   Able to walk? Yes   Fall in past 12 months? No     Abuse Screen:  Patient is not abused       Screening EKG   EKG order placed: No    Patient Care Team   Patient Care Team:  Abraham Man MD as PCP - General (Internal Medicine)  Abraham Man MD as PCP - [address] [place] [address] [facility]  Jeannette Evans MD (Surgery)     End of Life Planning   Advanced care planning directives were discussed with the patient and /or family/caregiver. Assessment/Plan   Education and counseling provided:  Are appropriate based on today's review and evaluation    Diagnoses and all orders for this visit:    1. Acquired hypothyroidism  -     METABOLIC PANEL, COMPREHENSIVE; Future  -     TSH 3RD GENERATION; Future  -     liothyronine (CYTOMEL) 25 mcg tablet; Take 1 Tab by mouth daily.  -     T3, FREE; Future  -     T4, FREE; Future  -     TSH 3RD GENERATION; Future  -     levothyroxine (SYNTHROID) 75 mcg tablet; TAKE ONE TABLET BY MOUTH ONCE DAILY BEFORE BREAKFAST    2. Dysthymia  -     fluvoxaMINE (LUVOX) 100 mg tablet; Take 1 Tab by mouth nightly. 3. Abnormal uterine bleeding  -     CBC W/O DIFF; Future    4. B12 deficiency  -     VITAMIN B12; Future    5. Welcome to Medicare preventive visit    6. Pure hypercholesterolemia  -     LIPID PANEL;  Future        Health Maintenance Due   Topic Date Due    Shingrix Vaccine Age 49> (2 of 2) 10/30/2019    Influenza Age 5 to Adult  08/01/2020

## 2020-08-19 DIAGNOSIS — E53.8 B12 DEFICIENCY: Primary | ICD-10-CM

## 2020-08-19 RX ORDER — LEVOTHYROXINE AND LIOTHYRONINE 9.5; 2.25 UG/1; UG/1
TABLET ORAL
Qty: 180 TAB | Refills: 1 | Status: SHIPPED | OUTPATIENT
Start: 2020-08-19

## 2020-08-24 ENCOUNTER — HOSPITAL ENCOUNTER (OUTPATIENT)
Dept: LAB | Age: 65
Discharge: HOME | End: 2020-08-24
Payer: MEDICARE

## 2020-08-24 DIAGNOSIS — R39.9 UTI SYMPTOMS: ICD-10-CM

## 2020-08-24 DIAGNOSIS — R39.9 UTI SYMPTOMS: Primary | ICD-10-CM

## 2020-08-24 PROCEDURE — 87086 URINE CULTURE/COLONY COUNT: CPT

## 2020-08-24 PROCEDURE — 87186 SC STD MICRODIL/AGAR DIL: CPT

## 2020-08-24 PROCEDURE — 87077 CULTURE AEROBIC IDENTIFY: CPT

## 2020-08-24 RX ORDER — FLUCONAZOLE 150 MG/1
150 TABLET ORAL DAILY
Qty: 1 TAB | Refills: 0 | Status: SHIPPED | OUTPATIENT
Start: 2020-08-24 | End: 2020-08-25

## 2020-08-24 RX ORDER — NITROFURANTOIN 25; 75 MG/1; MG/1
100 CAPSULE ORAL 2 TIMES DAILY
Qty: 14 CAP | Refills: 0 | Status: SHIPPED | OUTPATIENT
Start: 2020-08-24 | End: 2020-08-27 | Stop reason: ALTCHOICE

## 2020-08-27 LAB
BACTERIA SPEC CULT: ABNORMAL
BACTERIA SPEC CULT: ABNORMAL
CC UR VC: ABNORMAL
SERVICE CMNT-IMP: ABNORMAL

## 2020-08-27 RX ORDER — SULFAMETHOXAZOLE AND TRIMETHOPRIM 800; 160 MG/1; MG/1
1 TABLET ORAL 2 TIMES DAILY
Qty: 6 TAB | Refills: 0 | Status: SHIPPED | OUTPATIENT
Start: 2020-08-27 | End: 2020-08-30

## 2020-10-13 DIAGNOSIS — E03.9 ACQUIRED HYPOTHYROIDISM: ICD-10-CM

## 2020-10-13 RX ORDER — LEVOTHYROXINE SODIUM 75 UG/1
TABLET ORAL
Qty: 90 TAB | Refills: 0 | Status: SHIPPED | OUTPATIENT
Start: 2020-10-13 | End: 2020-12-02 | Stop reason: SDUPTHER

## 2020-10-13 RX ORDER — CYANOCOBALAMIN 1000 UG/ML
INJECTION, SOLUTION INTRAMUSCULAR; SUBCUTANEOUS
Qty: 1 VIAL | Refills: 1 | Status: SHIPPED | OUTPATIENT
Start: 2020-10-13 | End: 2020-12-02 | Stop reason: SDUPTHER

## 2020-10-13 RX ORDER — LIOTHYRONINE SODIUM 25 UG/1
TABLET ORAL
Qty: 90 TAB | Refills: 0 | Status: SHIPPED | OUTPATIENT
Start: 2020-10-13 | End: 2020-12-02 | Stop reason: SDUPTHER

## 2020-12-02 DIAGNOSIS — E03.9 ACQUIRED HYPOTHYROIDISM: ICD-10-CM

## 2020-12-02 DIAGNOSIS — F34.1 DYSTHYMIA: ICD-10-CM

## 2020-12-03 RX ORDER — CYANOCOBALAMIN 1000 UG/ML
1000 INJECTION, SOLUTION INTRAMUSCULAR; SUBCUTANEOUS
Qty: 1 VIAL | Refills: 1
Start: 2020-12-03

## 2020-12-03 RX ORDER — LEVOTHYROXINE SODIUM 75 UG/1
TABLET ORAL
Qty: 90 TAB | Refills: 0 | Status: SHIPPED | OUTPATIENT
Start: 2020-12-03

## 2020-12-03 RX ORDER — LIOTHYRONINE SODIUM 25 UG/1
25 TABLET ORAL DAILY
Qty: 90 TAB | Refills: 0 | Status: SHIPPED | OUTPATIENT
Start: 2020-12-03

## 2020-12-03 RX ORDER — FLUVOXAMINE MALEATE 100 MG/1
100 TABLET, COATED ORAL
Qty: 90 TAB | Refills: 0 | Status: SHIPPED | OUTPATIENT
Start: 2020-12-03 | End: 2021-02-11 | Stop reason: SDUPTHER

## 2020-12-15 DIAGNOSIS — Z12.31 ENCOUNTER FOR SCREENING MAMMOGRAM FOR MALIGNANT NEOPLASM OF BREAST: Primary | ICD-10-CM

## 2020-12-16 ENCOUNTER — TRANSCRIBE ORDER (OUTPATIENT)
Dept: SCHEDULING | Age: 65
End: 2020-12-16

## 2020-12-16 DIAGNOSIS — Z12.31 SCREENING MAMMOGRAM FOR HIGH-RISK PATIENT: Primary | ICD-10-CM

## 2021-01-19 ENCOUNTER — HOSPITAL ENCOUNTER (OUTPATIENT)
Dept: MAMMOGRAPHY | Age: 66
Discharge: HOME | End: 2021-01-19
Attending: INTERNAL MEDICINE
Payer: MEDICARE

## 2021-01-19 DIAGNOSIS — Z12.31 SCREENING MAMMOGRAM FOR HIGH-RISK PATIENT: ICD-10-CM

## 2021-01-19 PROCEDURE — 77063 BREAST TOMOSYNTHESIS BI: CPT

## 2021-02-09 ENCOUNTER — PATIENT MESSAGE (OUTPATIENT)
Dept: FAMILY MEDICINE CLINIC | Age: 66
End: 2021-02-09

## 2021-02-09 DIAGNOSIS — F34.1 DYSTHYMIA: ICD-10-CM

## 2021-02-10 NOTE — TELEPHONE ENCOUNTER
From: Barrett Schulteain  To: [address]  Sent: 2/9/2021 4:46 PM EST  Subject: Prescription Question    I was a patient of Dr. Mauri Ramos. It appears that she renewed most of my prescriptions before she left last month except Fluvoxamine. Would the nurse practitioner or someone else, please send a renewal to [facility] on [address], #[phone]? Thank you.

## 2021-02-11 NOTE — TELEPHONE ENCOUNTER
Called pharmacy they did received the prescription however she already filled it on 12/4/20 and only has enough to last about 28 more days. I did ask patient if she has re established with a new provider however she states she is moving to [place] and will get established once she gets there. Patient is request a 90 day fill to cover till she can schedule. Last seen 8/18/20    Last filled 12/3/20 qty 90 w/ 0 refills     Note to pharmacy:    Dr. Wyatt Baker is no longer with [address] [facility]. Patient needs to re-establish care for further refills. Rx written for 90 days w/ 0 refills.

## 2021-02-11 NOTE — TELEPHONE ENCOUNTER
Regarding: FW: Prescription Question  Contact: [phone]  I looked at her med list, and it looks like #90 was sent in early December. Can you pls call the pharmacy to see if that refill is still on file and available for her?

## 2021-02-12 RX ORDER — FLUVOXAMINE MALEATE 100 MG/1
100 TABLET, COATED ORAL
Qty: 90 TAB | Refills: 0 | Status: SHIPPED | OUTPATIENT
Start: 2021-02-12

## 2021-06-14 ENCOUNTER — TRANSCRIBE ORDER (OUTPATIENT)
Dept: SCHEDULING | Age: 66
End: 2021-06-14

## 2021-06-14 DIAGNOSIS — N95.1 SYMPTOMATIC MENOPAUSAL OR FEMALE CLIMACTERIC STATES: Primary | ICD-10-CM

## 2021-06-14 DIAGNOSIS — M81.0 AGE-RELATED OSTEOPOROSIS WITHOUT CURRENT PATHOLOGICAL FRACTURE: ICD-10-CM

## 2021-06-25 ENCOUNTER — HOSPITAL ENCOUNTER (OUTPATIENT)
Dept: BONE DENSITY | Age: 66
Discharge: HOME | End: 2021-06-25
Attending: SPECIALIST
Payer: MEDICARE

## 2021-06-25 DIAGNOSIS — Z78.0 POSTMENOPAUSAL STATE: ICD-10-CM

## 2021-06-25 DIAGNOSIS — N95.1 SYMPTOMATIC MENOPAUSAL OR FEMALE CLIMACTERIC STATES: ICD-10-CM

## 2021-06-25 PROCEDURE — 77080 DXA BONE DENSITY AXIAL: CPT

## 2024-02-22 ENCOUNTER — APPOINTMENT (RX ONLY)
Dept: URBAN - METROPOLITAN AREA CLINIC 159 | Facility: CLINIC | Age: 69
Setting detail: DERMATOLOGY
End: 2024-02-22

## 2024-02-22 DIAGNOSIS — D16 BENIGN NEOPLASM OF BONE AND ARTICULAR CARTILAGE: ICD-10-CM

## 2024-02-22 PROBLEM — D48.5 NEOPLASM OF UNCERTAIN BEHAVIOR OF SKIN: Status: ACTIVE | Noted: 2024-02-22

## 2024-02-22 PROCEDURE — 99212 OFFICE O/P EST SF 10 MIN: CPT

## 2024-02-22 PROCEDURE — ? COUNSELING

## 2024-02-22 PROCEDURE — ? ADDITIONAL NOTES

## 2024-02-22 ASSESSMENT — LOCATION ZONE DERM: LOCATION ZONE: FACE

## 2024-02-22 ASSESSMENT — LOCATION SIMPLE DESCRIPTION DERM: LOCATION SIMPLE: LEFT FOREHEAD

## 2024-02-22 ASSESSMENT — LOCATION DETAILED DESCRIPTION DERM: LOCATION DETAILED: LEFT MEDIAL FOREHEAD

## 2024-02-22 NOTE — HPI: EVALUATION OF SKIN LESION(S)
Hpi Title: Evaluation of a Skin Lesion
Additional History: C/o bump on the left temple, pt reports itching when first appeared. Uncomfortable with pressure but not painful. Hx lipomas on the body.

## 2024-02-22 NOTE — PROCEDURE: ADDITIONAL NOTES
Additional Notes: Discussed most likely pilar cyst with underlying osteoma. More noticeable now due to thinning of the skin. Recommended consult with PCP for ct scan. \\nIf pilar cyst, can RTC for ILK.\\nPhoto taken.
Detail Level: Simple
Render Risk Assessment In Note?: no

## 2025-01-28 ENCOUNTER — APPOINTMENT (OUTPATIENT)
Dept: URBAN - METROPOLITAN AREA CLINIC 159 | Facility: CLINIC | Age: 70
Setting detail: DERMATOLOGY
End: 2025-01-28

## 2025-01-28 DIAGNOSIS — D18.0 HEMANGIOMA: ICD-10-CM

## 2025-01-28 DIAGNOSIS — L82.1 OTHER SEBORRHEIC KERATOSIS: ICD-10-CM

## 2025-01-28 DIAGNOSIS — D485 NEOPLASM OF UNCERTAIN BEHAVIOR OF SKIN: ICD-10-CM

## 2025-01-28 DIAGNOSIS — Z71.89 OTHER SPECIFIED COUNSELING: ICD-10-CM

## 2025-01-28 DIAGNOSIS — D22 MELANOCYTIC NEVI: ICD-10-CM

## 2025-01-28 DIAGNOSIS — Z87.2 PERSONAL HISTORY OF DISEASES OF THE SKIN AND SUBCUTANEOUS TISSUE: ICD-10-CM

## 2025-01-28 DIAGNOSIS — L81.4 OTHER MELANIN HYPERPIGMENTATION: ICD-10-CM

## 2025-01-28 PROBLEM — D18.01 HEMANGIOMA OF SKIN AND SUBCUTANEOUS TISSUE: Status: ACTIVE | Noted: 2025-01-28

## 2025-01-28 PROBLEM — D22.9 MELANOCYTIC NEVI, UNSPECIFIED: Status: ACTIVE | Noted: 2025-01-28

## 2025-01-28 PROBLEM — D48.5 NEOPLASM OF UNCERTAIN BEHAVIOR OF SKIN: Status: ACTIVE | Noted: 2025-01-28

## 2025-01-28 PROCEDURE — ? COUNSELING

## 2025-01-28 PROCEDURE — 99213 OFFICE O/P EST LOW 20 MIN: CPT

## 2025-01-28 PROCEDURE — ? DEFER

## 2025-01-28 NOTE — PROCEDURE: DEFER
Detail Level: Detailed
Size Of Lesion In Cm (Optional): 0
Introduction Text (Please End With A Colon): The following procedure was deferred:
Instructions (Optional): Consider following up at [facility] for evaluation and treatment.  Discussed differential. I favor benign lesion but I explained I can not determine etiology based on appearance.

## 2025-01-28 NOTE — HPI: PREVENTATIVE SKIN CHECK
What Is The Reason For Today's Visit?: Full Body Skin Examination
Additional History: C/o a spot on the tip of her nose.\\nWould like spot on left temple rechecked.

## 2025-07-29 ENCOUNTER — APPOINTMENT (OUTPATIENT)
Dept: URBAN - METROPOLITAN AREA CLINIC 159 | Facility: CLINIC | Age: 70
Setting detail: DERMATOLOGY
End: 2025-07-29

## 2025-07-29 DIAGNOSIS — D22 MELANOCYTIC NEVI: ICD-10-CM

## 2025-07-29 DIAGNOSIS — Z71.89 OTHER SPECIFIED COUNSELING: ICD-10-CM

## 2025-07-29 DIAGNOSIS — L81.4 OTHER MELANIN HYPERPIGMENTATION: ICD-10-CM

## 2025-07-29 DIAGNOSIS — I78.8 OTHER DISEASES OF CAPILLARIES: ICD-10-CM

## 2025-07-29 DIAGNOSIS — L82.1 OTHER SEBORRHEIC KERATOSIS: ICD-10-CM

## 2025-07-29 DIAGNOSIS — D18.0 HEMANGIOMA: ICD-10-CM

## 2025-07-29 PROCEDURE — ? COUNSELING

## 2025-07-29 ASSESSMENT — LOCATION SIMPLE DESCRIPTION DERM
LOCATION SIMPLE: CHEST
LOCATION SIMPLE: NOSE

## 2025-07-29 ASSESSMENT — LOCATION DETAILED DESCRIPTION DERM
LOCATION DETAILED: UPPER STERNUM
LOCATION DETAILED: NASAL SUPRATIP

## 2025-07-29 ASSESSMENT — LOCATION ZONE DERM
LOCATION ZONE: NOSE
LOCATION ZONE: TRUNK

## 2025-07-29 NOTE — PROCEDURE: COUNSELING
Detail Level: Generalized
Detail Level: Detailed
Patient Specific Counseling (Will Not Stick From Patient To Patient): Notify if any change or bleeding

## 2025-07-29 NOTE — HPI: FULL BODY SKIN EXAMINATION
What Is The Reason For Today's Visit?: Full Body Skin Examination
What Is The Reason For Today's Visit? (Being Monitored For X): concerning skin lesions on an annual basis
Additional History: Spot on the top of the nose pt states present for 2 years not resolving

## (undated) DEVICE — PAD PREP M ISO ALC 2 PLY NONWOVEN SFT ABSRB

## (undated) DEVICE — STERILE POLYISOPRENE POWDER-FREE SURGICAL GLOVES: Brand: PROTEXIS

## (undated) DEVICE — DECANTER VI C-FLO LF --

## (undated) DEVICE — SYR 10ML CTRL LR LCK NSAF LF --

## (undated) DEVICE — KENDALL SCD EXPRESS SLEEVES, KNEE LENGTH, MEDIUM: Brand: KENDALL SCD

## (undated) DEVICE — DEPAUL MAJOR PROCEDURE PACK: Brand: MEDLINE INDUSTRIES, INC.

## (undated) DEVICE — STERILE POLYISOPRENE POWDER-FREE SURGICAL GLOVES WITH EMOLLIENT COATING: Brand: PROTEXIS

## (undated) DEVICE — SOL IRR NACL 0.9% 500ML POUR --

## (undated) DEVICE — SUTURE VCRL SZ 3-0 L27IN ABSRB VLT L26MM SH 1/2 CIR J316H

## (undated) DEVICE — (D)STRIP SKN CLSR 0.5X4IN WHT --

## (undated) DEVICE — SUTURE MCRYL SZ 4-0 L18IN ABSRB UD L19MM PS-2 3/8 CIR PRIM Y496G

## (undated) DEVICE — NEEDLE HYPO 25GA L1.5IN BLU POLYPR HUB S STL REG BVL STR

## (undated) DEVICE — REM POLYHESIVE ADULT PATIENT RETURN ELECTRODE: Brand: VALLEYLAB